# Patient Record
Sex: MALE | Race: WHITE | NOT HISPANIC OR LATINO | Employment: OTHER | URBAN - METROPOLITAN AREA
[De-identification: names, ages, dates, MRNs, and addresses within clinical notes are randomized per-mention and may not be internally consistent; named-entity substitution may affect disease eponyms.]

---

## 2018-01-17 ENCOUNTER — APPOINTMENT (OUTPATIENT)
Dept: LAB | Facility: HOSPITAL | Age: 48
End: 2018-01-17

## 2018-01-17 ENCOUNTER — TRANSCRIBE ORDERS (OUTPATIENT)
Dept: LAB | Facility: HOSPITAL | Age: 48
End: 2018-01-17

## 2018-01-17 DIAGNOSIS — R03.0 ELEVATED BLOOD PRESSURE READING WITHOUT DIAGNOSIS OF HYPERTENSION: Primary | ICD-10-CM

## 2018-01-17 LAB
ALBUMIN SERPL-MCNC: 4.8 G/DL (ref 3.2–4.8)
ALBUMIN/GLOB SERPL: 1.7 G/DL (ref 1.5–2.5)
ALP SERPL-CCNC: 81 U/L (ref 25–100)
ALT SERPL W P-5'-P-CCNC: 155 U/L (ref 7–40)
ANION GAP SERPL CALCULATED.3IONS-SCNC: 9 MMOL/L (ref 3–11)
AST SERPL-CCNC: 80 U/L (ref 0–33)
BASOPHILS # BLD AUTO: 0.02 10*3/MM3 (ref 0–0.2)
BASOPHILS NFR BLD AUTO: 0.3 % (ref 0–1)
BILIRUB SERPL-MCNC: 1 MG/DL (ref 0.3–1.2)
BUN BLD-MCNC: 17 MG/DL (ref 9–23)
BUN/CREAT SERPL: 17 (ref 7–25)
CALCIUM SPEC-SCNC: 9.9 MG/DL (ref 8.7–10.4)
CHLORIDE SERPL-SCNC: 99 MMOL/L (ref 99–109)
CO2 SERPL-SCNC: 28 MMOL/L (ref 20–31)
CREAT BLD-MCNC: 1 MG/DL (ref 0.6–1.3)
DEPRECATED RDW RBC AUTO: 41.8 FL (ref 37–54)
EOSINOPHIL # BLD AUTO: 0.04 10*3/MM3 (ref 0–0.3)
EOSINOPHIL NFR BLD AUTO: 0.5 % (ref 0–3)
ERYTHROCYTE [DISTWIDTH] IN BLOOD BY AUTOMATED COUNT: 12.2 % (ref 11.3–14.5)
GFR SERPL CREATININE-BSD FRML MDRD: 79 ML/MIN/1.73
GLOBULIN UR ELPH-MCNC: 2.9 GM/DL
GLUCOSE BLD-MCNC: 81 MG/DL (ref 70–100)
HCT VFR BLD AUTO: 46.4 % (ref 38.9–50.9)
HGB BLD-MCNC: 16 G/DL (ref 13.1–17.5)
IMM GRANULOCYTES # BLD: 0.02 10*3/MM3 (ref 0–0.03)
IMM GRANULOCYTES NFR BLD: 0.3 % (ref 0–0.6)
LYMPHOCYTES # BLD AUTO: 2.52 10*3/MM3 (ref 0.6–4.8)
LYMPHOCYTES NFR BLD AUTO: 34.6 % (ref 24–44)
MCH RBC QN AUTO: 32.8 PG (ref 27–31)
MCHC RBC AUTO-ENTMCNC: 34.5 G/DL (ref 32–36)
MCV RBC AUTO: 95.1 FL (ref 80–99)
MONOCYTES # BLD AUTO: 0.49 10*3/MM3 (ref 0–1)
MONOCYTES NFR BLD AUTO: 6.7 % (ref 0–12)
NEUTROPHILS # BLD AUTO: 4.2 10*3/MM3 (ref 1.5–8.3)
NEUTROPHILS NFR BLD AUTO: 57.6 % (ref 41–71)
PLATELET # BLD AUTO: 219 10*3/MM3 (ref 150–450)
PMV BLD AUTO: 10.7 FL (ref 6–12)
POTASSIUM BLD-SCNC: 4.1 MMOL/L (ref 3.5–5.5)
PROT SERPL-MCNC: 7.7 G/DL (ref 5.7–8.2)
RBC # BLD AUTO: 4.88 10*6/MM3 (ref 4.2–5.76)
SODIUM BLD-SCNC: 136 MMOL/L (ref 132–146)
WBC NRBC COR # BLD: 7.29 10*3/MM3 (ref 3.5–10.8)

## 2018-01-17 PROCEDURE — 85025 COMPLETE CBC W/AUTO DIFF WBC: CPT | Performed by: NURSE PRACTITIONER

## 2018-01-17 PROCEDURE — 80053 COMPREHEN METABOLIC PANEL: CPT | Performed by: NURSE PRACTITIONER

## 2018-01-17 PROCEDURE — 36415 COLL VENOUS BLD VENIPUNCTURE: CPT | Performed by: NURSE PRACTITIONER

## 2018-07-25 ENCOUNTER — HOSPITAL ENCOUNTER (OUTPATIENT)
Dept: GENERAL RADIOLOGY | Facility: HOSPITAL | Age: 48
Discharge: HOME | End: 2018-07-25
Admitting: NURSE PRACTITIONER

## 2018-07-25 ENCOUNTER — TRANSCRIBE ORDERS (OUTPATIENT)
Dept: ADMINISTRATIVE | Facility: HOSPITAL | Age: 48
End: 2018-07-25

## 2018-07-25 DIAGNOSIS — R05.9 COUGH: Primary | ICD-10-CM

## 2018-07-25 PROCEDURE — 71046 X-RAY EXAM CHEST 2 VIEWS: CPT

## 2018-10-19 ENCOUNTER — HOSPITAL ENCOUNTER (INPATIENT)
Facility: HOSPITAL | Age: 48
LOS: 2 days | Discharge: HOME | End: 2018-10-22
Attending: EMERGENCY MEDICINE | Admitting: INTERNAL MEDICINE

## 2018-10-19 ENCOUNTER — APPOINTMENT (OUTPATIENT)
Dept: CT IMAGING | Facility: HOSPITAL | Age: 48
End: 2018-10-19

## 2018-10-19 ENCOUNTER — APPOINTMENT (OUTPATIENT)
Dept: GENERAL RADIOLOGY | Facility: HOSPITAL | Age: 48
End: 2018-10-19

## 2018-10-19 DIAGNOSIS — K56.609 SMALL BOWEL OBSTRUCTION (HCC): Primary | ICD-10-CM

## 2018-10-19 LAB
ALBUMIN SERPL-MCNC: 4.66 G/DL (ref 3.2–4.8)
ALBUMIN/GLOB SERPL: 2.1 G/DL (ref 1.5–2.5)
ALP SERPL-CCNC: 65 U/L (ref 25–100)
ALT SERPL W P-5'-P-CCNC: 55 U/L (ref 7–40)
ANION GAP SERPL CALCULATED.3IONS-SCNC: 7 MMOL/L (ref 3–11)
AST SERPL-CCNC: 35 U/L (ref 0–33)
BASOPHILS # BLD AUTO: 0.02 10*3/MM3 (ref 0–0.2)
BASOPHILS NFR BLD AUTO: 0.2 % (ref 0–1)
BILIRUB SERPL-MCNC: 0.8 MG/DL (ref 0.3–1.2)
BILIRUB UR QL STRIP: NEGATIVE
BUN BLD-MCNC: 14 MG/DL (ref 9–23)
BUN/CREAT SERPL: 14.7 (ref 7–25)
CALCIUM SPEC-SCNC: 9.6 MG/DL (ref 8.7–10.4)
CHLORIDE SERPL-SCNC: 105 MMOL/L (ref 99–109)
CLARITY UR: CLEAR
CO2 SERPL-SCNC: 29 MMOL/L (ref 20–31)
COLOR UR: YELLOW
CREAT BLD-MCNC: 0.95 MG/DL (ref 0.6–1.3)
D-LACTATE SERPL-SCNC: 2.1 MMOL/L (ref 0.5–2)
DEPRECATED RDW RBC AUTO: 41.9 FL (ref 37–54)
EOSINOPHIL # BLD AUTO: 0.1 10*3/MM3 (ref 0–0.3)
EOSINOPHIL NFR BLD AUTO: 0.9 % (ref 0–3)
ERYTHROCYTE [DISTWIDTH] IN BLOOD BY AUTOMATED COUNT: 12 % (ref 11.3–14.5)
GFR SERPL CREATININE-BSD FRML MDRD: 84 ML/MIN/1.73
GLOBULIN UR ELPH-MCNC: 2.2 GM/DL
GLUCOSE BLD-MCNC: 112 MG/DL (ref 70–100)
GLUCOSE UR STRIP-MCNC: NEGATIVE MG/DL
HCT VFR BLD AUTO: 42.5 % (ref 38.9–50.9)
HGB BLD-MCNC: 14.9 G/DL (ref 13.1–17.5)
HGB UR QL STRIP.AUTO: NEGATIVE
HOLD SPECIMEN: NORMAL
HOLD SPECIMEN: NORMAL
IMM GRANULOCYTES # BLD: 0.02 10*3/MM3 (ref 0–0.03)
IMM GRANULOCYTES NFR BLD: 0.2 % (ref 0–0.6)
KETONES UR QL STRIP: NEGATIVE
LEUKOCYTE ESTERASE UR QL STRIP.AUTO: NEGATIVE
LIPASE SERPL-CCNC: 49 U/L (ref 6–51)
LYMPHOCYTES # BLD AUTO: 2.38 10*3/MM3 (ref 0.6–4.8)
LYMPHOCYTES NFR BLD AUTO: 22.6 % (ref 24–44)
MCH RBC QN AUTO: 33.6 PG (ref 27–31)
MCHC RBC AUTO-ENTMCNC: 35.1 G/DL (ref 32–36)
MCV RBC AUTO: 95.7 FL (ref 80–99)
MONOCYTES # BLD AUTO: 0.8 10*3/MM3 (ref 0–1)
MONOCYTES NFR BLD AUTO: 7.6 % (ref 0–12)
NEUTROPHILS # BLD AUTO: 7.25 10*3/MM3 (ref 1.5–8.3)
NEUTROPHILS NFR BLD AUTO: 68.7 % (ref 41–71)
NITRITE UR QL STRIP: NEGATIVE
PH UR STRIP.AUTO: 5.5 [PH] (ref 5–8)
PLATELET # BLD AUTO: 196 10*3/MM3 (ref 150–450)
PMV BLD AUTO: 10.2 FL (ref 6–12)
POTASSIUM BLD-SCNC: 4.1 MMOL/L (ref 3.5–5.5)
PROT SERPL-MCNC: 6.9 G/DL (ref 5.7–8.2)
PROT UR QL STRIP: NEGATIVE
RBC # BLD AUTO: 4.44 10*6/MM3 (ref 4.2–5.76)
SODIUM BLD-SCNC: 141 MMOL/L (ref 132–146)
SP GR UR STRIP: 1.02 (ref 1–1.03)
TROPONIN I SERPL-MCNC: 0 NG/ML (ref 0–0.07)
UROBILINOGEN UR QL STRIP: NORMAL
WBC NRBC COR # BLD: 10.55 10*3/MM3 (ref 3.5–10.8)
WHOLE BLOOD HOLD SPECIMEN: NORMAL
WHOLE BLOOD HOLD SPECIMEN: NORMAL

## 2018-10-19 PROCEDURE — 71045 X-RAY EXAM CHEST 1 VIEW: CPT

## 2018-10-19 PROCEDURE — 84484 ASSAY OF TROPONIN QUANT: CPT

## 2018-10-19 PROCEDURE — 85025 COMPLETE CBC W/AUTO DIFF WBC: CPT | Performed by: EMERGENCY MEDICINE

## 2018-10-19 PROCEDURE — 83605 ASSAY OF LACTIC ACID: CPT | Performed by: EMERGENCY MEDICINE

## 2018-10-19 PROCEDURE — 80053 COMPREHEN METABOLIC PANEL: CPT | Performed by: EMERGENCY MEDICINE

## 2018-10-19 PROCEDURE — 99285 EMERGENCY DEPT VISIT HI MDM: CPT

## 2018-10-19 PROCEDURE — 81003 URINALYSIS AUTO W/O SCOPE: CPT | Performed by: EMERGENCY MEDICINE

## 2018-10-19 PROCEDURE — 25010000002 MORPHINE PER 10 MG: Performed by: EMERGENCY MEDICINE

## 2018-10-19 PROCEDURE — 25010000002 ONDANSETRON PER 1 MG: Performed by: EMERGENCY MEDICINE

## 2018-10-19 PROCEDURE — 93005 ELECTROCARDIOGRAM TRACING: CPT

## 2018-10-19 PROCEDURE — 74177 CT ABD & PELVIS W/CONTRAST: CPT

## 2018-10-19 PROCEDURE — 93005 ELECTROCARDIOGRAM TRACING: CPT | Performed by: EMERGENCY MEDICINE

## 2018-10-19 PROCEDURE — 83690 ASSAY OF LIPASE: CPT | Performed by: EMERGENCY MEDICINE

## 2018-10-19 RX ORDER — MORPHINE SULFATE 4 MG/ML
4 INJECTION, SOLUTION INTRAMUSCULAR; INTRAVENOUS ONCE
Status: COMPLETED | OUTPATIENT
Start: 2018-10-19 | End: 2018-10-19

## 2018-10-19 RX ORDER — SODIUM CHLORIDE 0.9 % (FLUSH) 0.9 %
10 SYRINGE (ML) INJECTION AS NEEDED
Status: DISCONTINUED | OUTPATIENT
Start: 2018-10-19 | End: 2018-10-22 | Stop reason: HOSPADM

## 2018-10-19 RX ORDER — OMEPRAZOLE 20 MG/1
20 CAPSULE, DELAYED RELEASE ORAL DAILY
COMMUNITY
End: 2019-06-11

## 2018-10-19 RX ORDER — ONDANSETRON 2 MG/ML
4 INJECTION INTRAMUSCULAR; INTRAVENOUS ONCE
Status: COMPLETED | OUTPATIENT
Start: 2018-10-19 | End: 2018-10-19

## 2018-10-19 RX ORDER — ATORVASTATIN CALCIUM 10 MG/1
TABLET, FILM COATED ORAL DAILY
COMMUNITY
Start: 2018-07-25 | End: 2019-02-22 | Stop reason: SDUPTHER

## 2018-10-19 RX ORDER — LOSARTAN POTASSIUM 25 MG/1
TABLET ORAL DAILY
COMMUNITY
Start: 2018-09-19 | End: 2019-03-12

## 2018-10-19 RX ADMIN — MORPHINE SULFATE 4 MG: 4 INJECTION, SOLUTION INTRAMUSCULAR; INTRAVENOUS at 22:03

## 2018-10-19 RX ADMIN — SODIUM CHLORIDE 1000 ML: 9 INJECTION, SOLUTION INTRAVENOUS at 22:03

## 2018-10-19 RX ADMIN — ONDANSETRON HYDROCHLORIDE 4 MG: 2 INJECTION, SOLUTION INTRAMUSCULAR; INTRAVENOUS at 22:03

## 2018-10-19 RX ADMIN — MORPHINE SULFATE 4 MG: 4 INJECTION, SOLUTION INTRAMUSCULAR; INTRAVENOUS at 23:47

## 2018-10-20 PROBLEM — E78.5 HLD (HYPERLIPIDEMIA): Status: ACTIVE | Noted: 2018-10-20

## 2018-10-20 PROBLEM — I10 HTN (HYPERTENSION): Status: ACTIVE | Noted: 2018-10-20

## 2018-10-20 PROBLEM — K21.9 GERD WITHOUT ESOPHAGITIS: Status: ACTIVE | Noted: 2018-10-20

## 2018-10-20 PROBLEM — K56.609 SMALL BOWEL OBSTRUCTION: Status: ACTIVE | Noted: 2018-10-20

## 2018-10-20 LAB
ANION GAP SERPL CALCULATED.3IONS-SCNC: 11 MMOL/L (ref 3–11)
BASOPHILS # BLD AUTO: 0.02 10*3/MM3 (ref 0–0.2)
BASOPHILS NFR BLD AUTO: 0.2 % (ref 0–1)
BUN BLD-MCNC: 15 MG/DL (ref 9–23)
BUN/CREAT SERPL: 17.6 (ref 7–25)
CALCIUM SPEC-SCNC: 9.3 MG/DL (ref 8.7–10.4)
CHLORIDE SERPL-SCNC: 101 MMOL/L (ref 99–109)
CO2 SERPL-SCNC: 26 MMOL/L (ref 20–31)
CREAT BLD-MCNC: 0.85 MG/DL (ref 0.6–1.3)
D-LACTATE SERPL-SCNC: 1.8 MMOL/L (ref 0.5–2)
D-LACTATE SERPL-SCNC: 2.5 MMOL/L (ref 0.5–2)
D-LACTATE SERPL-SCNC: 2.6 MMOL/L (ref 0.5–2)
DEPRECATED RDW RBC AUTO: 42.4 FL (ref 37–54)
EOSINOPHIL # BLD AUTO: 0 10*3/MM3 (ref 0–0.3)
EOSINOPHIL NFR BLD AUTO: 0 % (ref 0–3)
ERYTHROCYTE [DISTWIDTH] IN BLOOD BY AUTOMATED COUNT: 12 % (ref 11.3–14.5)
GFR SERPL CREATININE-BSD FRML MDRD: 95 ML/MIN/1.73
GLUCOSE BLD-MCNC: 117 MG/DL (ref 70–100)
HCT VFR BLD AUTO: 45.1 % (ref 38.9–50.9)
HGB BLD-MCNC: 15.4 G/DL (ref 13.1–17.5)
HOLD SPECIMEN: NORMAL
IMM GRANULOCYTES # BLD: 0.03 10*3/MM3 (ref 0–0.03)
IMM GRANULOCYTES NFR BLD: 0.2 % (ref 0–0.6)
LYMPHOCYTES # BLD AUTO: 1.41 10*3/MM3 (ref 0.6–4.8)
LYMPHOCYTES NFR BLD AUTO: 11 % (ref 24–44)
MCH RBC QN AUTO: 33.1 PG (ref 27–31)
MCHC RBC AUTO-ENTMCNC: 34.1 G/DL (ref 32–36)
MCV RBC AUTO: 97 FL (ref 80–99)
MONOCYTES # BLD AUTO: 1.04 10*3/MM3 (ref 0–1)
MONOCYTES NFR BLD AUTO: 8.1 % (ref 0–12)
NEUTROPHILS # BLD AUTO: 10.37 10*3/MM3 (ref 1.5–8.3)
NEUTROPHILS NFR BLD AUTO: 80.7 % (ref 41–71)
PLATELET # BLD AUTO: 192 10*3/MM3 (ref 150–450)
PMV BLD AUTO: 10.5 FL (ref 6–12)
POTASSIUM BLD-SCNC: 4.5 MMOL/L (ref 3.5–5.5)
RBC # BLD AUTO: 4.65 10*6/MM3 (ref 4.2–5.76)
SODIUM BLD-SCNC: 138 MMOL/L (ref 132–146)
WBC NRBC COR # BLD: 12.84 10*3/MM3 (ref 3.5–10.8)

## 2018-10-20 PROCEDURE — 25010000002 HEPARIN (PORCINE) PER 1000 UNITS: Performed by: HOSPITALIST

## 2018-10-20 PROCEDURE — 99223 1ST HOSP IP/OBS HIGH 75: CPT | Performed by: INTERNAL MEDICINE

## 2018-10-20 PROCEDURE — 25010000002 HYDROMORPHONE 1 MG/ML SOLUTION: Performed by: INTERNAL MEDICINE

## 2018-10-20 PROCEDURE — 83605 ASSAY OF LACTIC ACID: CPT | Performed by: HOSPITALIST

## 2018-10-20 PROCEDURE — 25010000002 HYDROMORPHONE PER 4 MG: Performed by: INTERNAL MEDICINE

## 2018-10-20 PROCEDURE — 85025 COMPLETE CBC W/AUTO DIFF WBC: CPT | Performed by: INTERNAL MEDICINE

## 2018-10-20 PROCEDURE — 0D9670Z DRAINAGE OF STOMACH WITH DRAINAGE DEVICE, VIA NATURAL OR ARTIFICIAL OPENING: ICD-10-PCS | Performed by: INTERNAL MEDICINE

## 2018-10-20 PROCEDURE — 83605 ASSAY OF LACTIC ACID: CPT | Performed by: NURSE PRACTITIONER

## 2018-10-20 PROCEDURE — 83605 ASSAY OF LACTIC ACID: CPT | Performed by: EMERGENCY MEDICINE

## 2018-10-20 PROCEDURE — 25010000002 ONDANSETRON PER 1 MG: Performed by: INTERNAL MEDICINE

## 2018-10-20 PROCEDURE — 25010000002 HYDROMORPHONE 1 MG/ML SOLUTION: Performed by: EMERGENCY MEDICINE

## 2018-10-20 PROCEDURE — 80048 BASIC METABOLIC PNL TOTAL CA: CPT | Performed by: INTERNAL MEDICINE

## 2018-10-20 PROCEDURE — 25010000002 IOPAMIDOL 61 % SOLUTION: Performed by: EMERGENCY MEDICINE

## 2018-10-20 RX ORDER — SODIUM CHLORIDE 0.9 % (FLUSH) 0.9 %
3 SYRINGE (ML) INJECTION EVERY 12 HOURS SCHEDULED
Status: DISCONTINUED | OUTPATIENT
Start: 2018-10-20 | End: 2018-10-22 | Stop reason: HOSPADM

## 2018-10-20 RX ORDER — NALOXONE HCL 0.4 MG/ML
0.4 VIAL (ML) INJECTION
Status: DISCONTINUED | OUTPATIENT
Start: 2018-10-20 | End: 2018-10-22 | Stop reason: HOSPADM

## 2018-10-20 RX ORDER — ONDANSETRON 2 MG/ML
4 INJECTION INTRAMUSCULAR; INTRAVENOUS EVERY 6 HOURS PRN
Status: DISCONTINUED | OUTPATIENT
Start: 2018-10-20 | End: 2018-10-22 | Stop reason: HOSPADM

## 2018-10-20 RX ORDER — SODIUM CHLORIDE 9 MG/ML
75 INJECTION, SOLUTION INTRAVENOUS CONTINUOUS
Status: DISCONTINUED | OUTPATIENT
Start: 2018-10-20 | End: 2018-10-22 | Stop reason: HOSPADM

## 2018-10-20 RX ORDER — NALOXONE HCL 0.4 MG/ML
0.4 VIAL (ML) INJECTION
Status: DISCONTINUED | OUTPATIENT
Start: 2018-10-20 | End: 2018-10-20

## 2018-10-20 RX ORDER — HEPARIN SODIUM 5000 [USP'U]/ML
5000 INJECTION, SOLUTION INTRAVENOUS; SUBCUTANEOUS EVERY 8 HOURS SCHEDULED
Status: DISCONTINUED | OUTPATIENT
Start: 2018-10-20 | End: 2018-10-22 | Stop reason: HOSPADM

## 2018-10-20 RX ORDER — HYDROMORPHONE HYDROCHLORIDE 1 MG/ML
0.5 INJECTION, SOLUTION INTRAMUSCULAR; INTRAVENOUS; SUBCUTANEOUS
Status: DISCONTINUED | OUTPATIENT
Start: 2018-10-20 | End: 2018-10-20

## 2018-10-20 RX ORDER — SODIUM CHLORIDE 0.9 % (FLUSH) 0.9 %
3-10 SYRINGE (ML) INJECTION AS NEEDED
Status: DISCONTINUED | OUTPATIENT
Start: 2018-10-20 | End: 2018-10-22 | Stop reason: HOSPADM

## 2018-10-20 RX ADMIN — HEPARIN SODIUM 5000 UNITS: 5000 INJECTION, SOLUTION INTRAVENOUS; SUBCUTANEOUS at 22:27

## 2018-10-20 RX ADMIN — ONDANSETRON 4 MG: 2 INJECTION, SOLUTION INTRAMUSCULAR; INTRAVENOUS at 06:20

## 2018-10-20 RX ADMIN — TOPICAL ANESTHETIC 1 SPRAY: 200 SPRAY DENTAL; PERIODONTAL at 23:06

## 2018-10-20 RX ADMIN — TOPICAL ANESTHETIC 1 SPRAY: 200 SPRAY DENTAL; PERIODONTAL at 03:40

## 2018-10-20 RX ADMIN — HYDROMORPHONE HYDROCHLORIDE 1 MG: 1 INJECTION, SOLUTION INTRAMUSCULAR; INTRAVENOUS; SUBCUTANEOUS at 08:26

## 2018-10-20 RX ADMIN — HYDROMORPHONE HYDROCHLORIDE 1 MG: 1 INJECTION, SOLUTION INTRAMUSCULAR; INTRAVENOUS; SUBCUTANEOUS at 19:46

## 2018-10-20 RX ADMIN — HYDROMORPHONE HYDROCHLORIDE 0.5 MG: 1 INJECTION, SOLUTION INTRAMUSCULAR; INTRAVENOUS; SUBCUTANEOUS at 04:41

## 2018-10-20 RX ADMIN — SODIUM CHLORIDE 500 ML: 9 INJECTION, SOLUTION INTRAVENOUS at 11:15

## 2018-10-20 RX ADMIN — HEPARIN SODIUM 5000 UNITS: 5000 INJECTION, SOLUTION INTRAVENOUS; SUBCUTANEOUS at 13:31

## 2018-10-20 RX ADMIN — SODIUM CHLORIDE 150 ML/HR: 9 INJECTION, SOLUTION INTRAVENOUS at 04:42

## 2018-10-20 RX ADMIN — HYDROMORPHONE HYDROCHLORIDE 1 MG: 1 INJECTION, SOLUTION INTRAMUSCULAR; INTRAVENOUS; SUBCUTANEOUS at 01:52

## 2018-10-20 RX ADMIN — HYDROMORPHONE HYDROCHLORIDE 0.5 MG: 1 INJECTION, SOLUTION INTRAMUSCULAR; INTRAVENOUS; SUBCUTANEOUS at 13:31

## 2018-10-20 RX ADMIN — SODIUM CHLORIDE 200 ML/HR: 9 INJECTION, SOLUTION INTRAVENOUS at 11:14

## 2018-10-20 RX ADMIN — TOPICAL ANESTHETIC 1 SPRAY: 200 SPRAY DENTAL; PERIODONTAL at 10:02

## 2018-10-20 RX ADMIN — SODIUM CHLORIDE 200 ML/HR: 9 INJECTION, SOLUTION INTRAVENOUS at 14:20

## 2018-10-20 RX ADMIN — TOPICAL ANESTHETIC 1 SPRAY: 200 SPRAY DENTAL; PERIODONTAL at 15:41

## 2018-10-20 RX ADMIN — IOPAMIDOL 85 ML: 612 INJECTION, SOLUTION INTRAVENOUS at 00:11

## 2018-10-20 RX ADMIN — SODIUM CHLORIDE 200 ML/HR: 9 INJECTION, SOLUTION INTRAVENOUS at 19:45

## 2018-10-20 NOTE — ED PROVIDER NOTES
Subjective   Eitan Royal a 48 year old male who presents to the emergency department with complaints of severe epigastric pain which onset at approximatley 3:30 today. He initially thought the pain was associated with the need to have a bowel movement but he reportedly had a bowel movement soon after onset, which was diarrhea, and did not relieve his pain. The patient states that his epigastric pain has gotten progressively worse, and currently rates his pain a 10/10. He has a PMHx of bowel obstruction, and a PSHx of bowel resection. He denies any nausea, vomiting, chest pain, soa, and any other acute symptoms at this time.        History provided by:  Patient  Abdominal Pain   Pain location:  Epigastric  Pain radiates to:  Does not radiate  Pain severity:  Severe  Onset quality:  Sudden  Duration:  6 hours  Timing:  Constant  Progression:  Worsening  Chronicity:  New  Relieved by:  Nothing  Worsened by:  Nothing  Ineffective treatments:  Bowel activity  Associated symptoms: diarrhea    Associated symptoms: no chest pain, no nausea, no shortness of breath and no vomiting        Review of Systems   Respiratory: Negative for shortness of breath.    Cardiovascular: Negative for chest pain.   Gastrointestinal: Positive for abdominal pain and diarrhea. Negative for nausea and vomiting.   All other systems reviewed and are negative.      Past Medical History:   Diagnosis Date   • GERD (gastroesophageal reflux disease)    • Hyperlipidemia    • Hypertension        No Known Allergies    Past Surgical History:   Procedure Laterality Date   • APPENDECTOMY     • CHOLECYSTECTOMY     • COLON SURGERY     • EXPLORATORY LAPAROTOMY     • KNEE CARTILAGE SURGERY      ACL and MCL       History reviewed. No pertinent family history.    Social History     Social History   • Marital status:      Social History Main Topics   • Smoking status: Former Smoker   • Smokeless tobacco: Current User   • Alcohol use 3.6 oz/week     6 Cans of  beer per week   • Drug use: No   • Sexual activity: Defer     Other Topics Concern   • Not on file         Objective   Physical Exam   Constitutional: He is oriented to person, place, and time. He appears well-developed and well-nourished. He appears distressed.   Patient is uncomfortable, pacing the floor.   HENT:   Head: Normocephalic and atraumatic.   Nose: Nose normal.   Mouth/Throat: Oropharynx is clear and moist. No oropharyngeal exudate.   Eyes: Pupils are equal, round, and reactive to light. Conjunctivae and EOM are normal.   Neck: Normal range of motion.   Cardiovascular: Normal rate, regular rhythm and normal heart sounds.    Pulmonary/Chest: Effort normal and breath sounds normal. No respiratory distress.   Abdominal: He exhibits distension. Bowel sounds are decreased. There is tenderness (diffuse).   Musculoskeletal: Normal range of motion. He exhibits no edema or tenderness.   Neurological: He is alert and oriented to person, place, and time. No cranial nerve deficit.   Skin: Skin is warm and dry.   Nursing note and vitals reviewed.      Procedures         ED Course  ED Course as of Oct 21 0205   Sat Oct 20, 2018   0046 Lactate, Venous: (!!) 2.1 [KG]   0100 Patient was advised of results at this time.  Gen. surgery will be contacted NG tube will be ordered.  Patient will be admitted to the hospital.  [KG]   0110 Dr. Shukla notified patient's presentation, results.  Patient will be admitted to the hospitalist.  She will see the patient in the a.m.  [KG]   0120 Hospitalist contacted at this time.   agrees to admit the patient.  [KG]      ED Course User Index  [KG] Mesha Lara, APRN     Recent Results (from the past 24 hour(s))   Lactic Acid, Reflex    Collection Time: 10/20/18  3:37 AM   Result Value Ref Range    Lactate 2.5 (C) 0.5 - 2.0 mmol/L   CBC Auto Differential    Collection Time: 10/20/18  5:57 AM   Result Value Ref Range    WBC 12.84 (H) 3.50 - 10.80 10*3/mm3    RBC 4.65 4.20 -  5.76 10*6/mm3    Hemoglobin 15.4 13.1 - 17.5 g/dL    Hematocrit 45.1 38.9 - 50.9 %    MCV 97.0 80.0 - 99.0 fL    MCH 33.1 (H) 27.0 - 31.0 pg    MCHC 34.1 32.0 - 36.0 g/dL    RDW 12.0 11.3 - 14.5 %    RDW-SD 42.4 37.0 - 54.0 fl    MPV 10.5 6.0 - 12.0 fL    Platelets 192 150 - 450 10*3/mm3    Neutrophil % 80.7 (H) 41.0 - 71.0 %    Lymphocyte % 11.0 (L) 24.0 - 44.0 %    Monocyte % 8.1 0.0 - 12.0 %    Eosinophil % 0.0 0.0 - 3.0 %    Basophil % 0.2 0.0 - 1.0 %    Immature Grans % 0.2 0.0 - 0.6 %    Neutrophils, Absolute 10.37 (H) 1.50 - 8.30 10*3/mm3    Lymphocytes, Absolute 1.41 0.60 - 4.80 10*3/mm3    Monocytes, Absolute 1.04 (H) 0.00 - 1.00 10*3/mm3    Eosinophils, Absolute 0.00 0.00 - 0.30 10*3/mm3    Basophils, Absolute 0.02 0.00 - 0.20 10*3/mm3    Immature Grans, Absolute 0.03 0.00 - 0.03 10*3/mm3   Basic Metabolic Panel    Collection Time: 10/20/18  5:57 AM   Result Value Ref Range    Glucose 117 (H) 70 - 100 mg/dL    BUN 15 9 - 23 mg/dL    Creatinine 0.85 0.60 - 1.30 mg/dL    Sodium 138 132 - 146 mmol/L    Potassium 4.5 3.5 - 5.5 mmol/L    Chloride 101 99 - 109 mmol/L    CO2 26.0 20.0 - 31.0 mmol/L    Calcium 9.3 8.7 - 10.4 mg/dL    eGFR Non African Amer 95 >60 mL/min/1.73    BUN/Creatinine Ratio 17.6 7.0 - 25.0    Anion Gap 11.0 3.0 - 11.0 mmol/L   Lactic Acid, Plasma    Collection Time: 10/20/18  5:57 AM   Result Value Ref Range    Lactate 2.6 (C) 0.5 - 2.0 mmol/L   Lactic Acid, Plasma    Collection Time: 10/20/18  3:23 PM   Result Value Ref Range    Lactate 1.8 0.5 - 2.0 mmol/L     Note: In addition to lab results from this visit, the labs listed above may include labs taken at another facility or during a different encounter within the last 24 hours. Please correlate lab times with ED admission and discharge times for further clarification of the services performed during this visit.    CT Abdomen Pelvis With Contrast   Final Result   1.  Early versus partial mid to small bowel obstruction in the right     midabdomen, transition point related to adhesions.   2.  Trace free fluid adjacent to dilated small bowel loops.  No free air.   3.  Incidental findings above.      THIS DOCUMENT HAS BEEN ELECTRONICALLY SIGNED BY NICK FENG MD      XR Chest 1 View   Final Result     No acute abnormality.  No detrimental change from prior exam.      THIS DOCUMENT HAS BEEN ELECTRONICALLY SIGNED BY NICK FENG MD        Vitals:    10/20/18 1145 10/20/18 1500 10/20/18 2000 10/21/18 0000   BP: 126/74 122/78 119/75 118/77   BP Location:       Patient Position:       Pulse: 70 68 65 55   Resp: 20 18 20 18   Temp: 98 °F (36.7 °C) 98.1 °F (36.7 °C) 98.5 °F (36.9 °C) 97.5 °F (36.4 °C)   TempSrc: Oral Oral Oral Oral   SpO2: 96% 94% 96% 94%   Weight:       Height:         Medications   sodium chloride 0.9 % flush 10 mL (not administered)   sodium chloride 0.9 % flush 10 mL (not administered)   sodium chloride 0.9 % flush 3 mL (3 mL Intravenous Not Given 10/20/18 2102)   sodium chloride 0.9 % flush 3-10 mL (not administered)   sodium chloride 0.9 % infusion (200 mL/hr Intravenous New Bag 10/21/18 0036)   ondansetron (ZOFRAN) injection 4 mg (4 mg Intravenous Given 10/20/18 0620)   benzocaine (HURRICAINE) 20 % liquid solution 1 spray (1 spray Mouth/Throat Given 10/20/18 2306)   HYDROmorphone (DILAUDID) injection 1 mg (1 mg Intravenous Given 10/20/18 1946)     And   naloxone (NARCAN) injection 0.4 mg (not administered)   heparin (porcine) 5000 UNIT/ML injection 5,000 Units (5,000 Units Subcutaneous Given 10/20/18 2227)   sodium chloride 0.9 % bolus 1,000 mL (0 mL Intravenous Stopped 10/19/18 2303)   Morphine sulfate (PF) injection 4 mg (4 mg Intravenous Given 10/19/18 2203)   ondansetron (ZOFRAN) injection 4 mg (4 mg Intravenous Given 10/19/18 2203)   Morphine sulfate (PF) injection 4 mg (4 mg Intravenous Given 10/19/18 2347)   iopamidol (ISOVUE-300) 61 % injection 100 mL (85 mL Intravenous Given 10/20/18 0011)   HYDROmorphone (DILAUDID)  injection 1 mg (1 mg Intravenous Given 10/20/18 0152)   sodium chloride 0.9 % bolus 500 mL (500 mL Intravenous New Bag 10/20/18 1115)     ECG/EMG Results (last 24 hours)     Procedure Component Value Units Date/Time    ECG 12 Lead [[id number]] Collected:  10/19/18 2047     Updated:  10/19/18 2048                      MDM    Final diagnoses:   Small bowel obstruction (CMS/HCC)       Documentation assistance provided by jeremías Canales.  Information recorded by the scribe was done at my direction and has been verified and validated by me.     Daniela Canales  10/19/18 2107       Daniela Canales  10/19/18 2154       Ryan Bull  10/19/18 2206       Mesha Lara, RUDDY  10/21/18 0207

## 2018-10-20 NOTE — PLAN OF CARE
Problem: Patient Care Overview  Goal: Plan of Care Review  Outcome: Ongoing (interventions implemented as appropriate)   10/20/18 8048   OTHER   Outcome Summary pain under better control this afternoon. NG to LWS in use. ivf nad noted.    Coping/Psychosocial   Plan of Care Reviewed With patient;spouse   Plan of Care Review   Progress improving

## 2018-10-20 NOTE — H&P
[facility] Medicine Services  HISTORY AND PHYSICAL    Patient Name: Eitan Royal  : 1970  MRN: [id number]  Primary Care Physician: Shahram Napier MD  Date of admission: 10/19/2018      Subjective   Subjective     Chief Complaint:  Abdominal pain, nausea and vomiting.    HPI:  Eitan Royal is a 48 y.o. male who presented to ER with c/o severe abdominal pain, around the epigastric area, associated with nausea and vomiting. Patient states that the pain started suddenly at about 3:30pm and he thought he would feel better after a bowel movement but that did not happen. Patient reports that he had a loose BM. No report of fever, chills, chest pain, SOB, urinary frequency or dysuria. He has had a prior h/o bowel obstruction in  and subsequently underwent partial colectomy and complicated appendectomy. Other past history includes HTN, GERD/ hiatal hernia and HLD. In the ER, CT scan of the abdomen/pelvis revealed early versus partial mild SBO in the right mid abdomen, transition point related to adhesion. Patient is admitted for further care.    Review of Systems   Constitutional: Negative for chills, diaphoresis, fatigue and fever.   HENT: Negative for congestion, sore throat and trouble swallowing.    Eyes: Negative for photophobia and visual disturbance.   Respiratory: Negative for cough, chest tightness, shortness of breath and wheezing.    Cardiovascular: Negative for chest pain, palpitations and leg swelling.   Gastrointestinal: Positive for abdominal distention, abdominal pain, diarrhea, nausea and vomiting. Negative for constipation.   Endocrine: Negative for polyuria.   Genitourinary: Negative for dysuria, flank pain, frequency, hematuria and urgency.   Musculoskeletal: Negative for arthralgias, back pain, neck pain and neck stiffness.   Skin: Negative for rash.   Neurological: Negative for dizziness, syncope, numbness and headaches.   Psychiatric/Behavioral: Negative for  agitation, confusion and hallucinations.   All other systems reviewed and are negative.         Personal History     History reviewed. No pertinent past medical history.    Past Surgical History:   Procedure Laterality Date   • APPENDECTOMY     • CHOLECYSTECTOMY     • COLON SURGERY     • EXPLORATORY LAPAROTOMY     • KNEE CARTILAGE SURGERY      ACL and MCL       Family History: family history is not on file.     Social History:  does not have a smoking history on file. He uses smokeless tobacco. He reports that he drinks about 3.6 oz of alcohol per week . He reports that he does not use drugs.  Social History     Social History Narrative   • No narrative on file       Medications:  Available home medication information reviewed     No Known Allergies    Objective   Objective     Vital Signs:   Temp:  [97.4 °F (36.3 °C)] 97.4 °F (36.3 °C)  Heart Rate:  [67] 67  Resp:  [20] 20  BP: (138)/(90) 138/90        Physical Exam   Constitutional: No acute distress, awake, alert  Eyes: PERRLA, sclerae anicteric, no conjunctival injection  HENT: NCAT, mucous membranes moist  Neck: Supple, no thyromegaly, no lymphadenopathy, trachea midline  Respiratory: Clear to auscultation bilaterally, nonlabored respirations   Cardiovascular: RRR, no murmurs, rubs, or gallops, palpable pedal pulses bilaterally  Gastrointestinal: Positive bowel sounds, soft, epigastric tenderness, mildly distended  Musculoskeletal: No bilateral ankle edema, no clubbing or cyanosis to extremities  Psychiatric: Appropriate affect, cooperative  Neurologic: Oriented x 3, strength symmetric in all extremities, Cranial Nerves grossly intact, speech clear  Skin: No rashes    Results Reviewed:  I have personally reviewed current lab, radiology, and data and agree.      Results from last 7 days  Lab Units 10/19/18  2053   WBC 10*3/mm3 10.55   HEMOGLOBIN g/dL 14.9   HEMATOCRIT % 42.5   PLATELETS 10*3/mm3 196       Results from last 7 days  Lab Units 10/19/18  2053    SODIUM mmol/L 141   POTASSIUM mmol/L 4.1   CHLORIDE mmol/L 105   CO2 mmol/L 29.0   BUN mg/dL 14   CREATININE mg/dL 0.95   GLUCOSE mg/dL 112*   CALCIUM mg/dL 9.6   ALT (SGPT) U/L 55*   AST (SGOT) U/L 35*     Estimated Creatinine Clearance: 116.7 mL/min (by C-G formula based on SCr of 0.95 mg/dL).  Brief Urine Lab Results  (Last result in the past 365 days)      Color   Clarity   Blood   Leuk Est   Nitrite   Protein   CREAT   Urine HCG        10/19/18 2345 Yellow Clear Negative Negative Negative Negative             No results found for: BNP  Imaging Results (last 24 hours)     Procedure Component Value Units Date/Time    CT Abdomen Pelvis With Contrast [[id number]] Collected:  10/19/18 2110     Updated:  10/20/18 0050    Narrative:       EXAM:    CT Abdomen and Pelvis With Intravenous Contrast    CLINICAL HISTORY:    48 years old, male; Pain; Abdominal pain; Other: Mid abd pain and n/v; Prior   surgery; Surgery type: Terri; Additional info: Abd pain, h/o sbo    TECHNIQUE:    Axial computed tomography images of the abdomen and pelvis with intravenous   contrast.  All CT scans at this facility use at least one of these dose   optimization techniques: automated exposure control; mA and/or kV adjustment   per patient size (includes targeted exams where dose is matched to clinical   indication); or iterative reconstruction.    Coronal and sagittal reformatted images were created and reviewed.    CONTRAST:    100 mL of isovue 300 administered intravenously.      COMPARISON:    No relevant prior studies available.    FINDINGS:    Lung bases: Calcified granuloma.  No mass.  No consolidation.     ABDOMEN:    Liver:  Subcentimeter hepatic hypodensity, probable cyst.    Gallbladder and bile ducts:  Cholecystectomy.  No ductal dilation.    Pancreas:  Unremarkable.  No mass.  No ductal dilation.    Spleen:  Unremarkable.  No splenomegaly.    Adrenals:  Unremarkable.  No mass.    Kidneys and ureters:  Right renal scarring.  No  obstructive uropathy.    Stomach and bowel:  Dilated mid small bowel loops with transition point in   the anterior mid abdomen with air angulated loops of bowel, likely   enteroperitoneal lesions.  No pneumatosis intestinalis.  Small bowel distal to   transition point is decompressed.  Enterocolonic anastomosis, right   hemicolectomy.     PELVIS:    Appendix:  See above.    Bladder:  Unremarkable.  No mass.    Reproductive:  Unremarkable as visualized.     ABDOMEN and PELVIS:    Intraperitoneal space: Trace free fluid adjacent to dilated small bowel   loops..  No free air.  No significant fluid collection.    Bones/joints:  No acute fracture.  No dislocation.    Soft tissues:  Unremarkable.    Vasculature:  Unremarkable.  No abdominal aortic aneurysm.    Lymph nodes:  Unremarkable.  No enlarged lymph nodes.      Impression:       1.  Early versus partial mid to small bowel obstruction in the right   midabdomen, transition point related to adhesions.  2.  Trace free fluid adjacent to dilated small bowel loops.  No free air.  3.  Incidental findings above.    THIS DOCUMENT HAS BEEN ELECTRONICALLY SIGNED BY NICK FENG MD    XR Chest 1 View [[id number]] Collected:  10/19/18 2046     Updated:  10/19/18 2135    Narrative:       EXAM:    XR Chest, 1 View    CLINICAL HISTORY:    48 years old, male; Signs and symptoms; Other: See notes; Additional info:   Upper abdominal pain triage protocol    TECHNIQUE:    Frontal view of the chest.    COMPARISON:    CR XR CHEST PA AND LATERAL 7/25/2018 4:04 PM    FINDINGS:    Lungs:  Unremarkable.  No consolidation.    Pleural space:  Unremarkable.  No pneumothorax.    Heart:  Unremarkable.  No cardiomegaly.    Mediastinum:  Unremarkable.    Bones/joints:  Unremarkable.      Impression:         No acute abnormality.  No detrimental change from prior exam.    THIS DOCUMENT HAS BEEN ELECTRONICALLY SIGNED BY NICK FENG MD             Assessment/Plan   Assessment / Plan     [facility]  "Problems    Diagnosis   • Small bowel obstruction (CMS/HCC)   • HTN (hypertension)   • GERD without esophagitis   • HLD (hyperlipidemia)         Assessment & Plan:  1. Partial or early SBO related to adhesions.  2. Mild transaminitis, chronic  3. H/O partial colectomy  4. HTN  5. Dyslipdemia      Admit to medical floor for further evaluation and treatment.  Will keep npo, insert NGT for gastric decompression.  Will continue IV hydration and pain control as needed.  Patient's wife tell me that his LFTs are chronically elevated and it was thought to be related to use of statins. However, patient drinks 6-8 beers \"occasionally\". Will monitor for ETOH withdrawal.  Lewis. SCDs for DVT prophylaxis.      CODE STATUS:    Code Status and Medical Interventions:   Ordered at: 10/20/18 0236     Level Of Support Discussed With:    Patient     Code Status:    CPR     Medical Interventions (Level of Support Prior to Arrest):    Full       Electronically signed by Isac Baum MD, 10/20/18, 2:37 AM.      "

## 2018-10-20 NOTE — CONSULTS
"General Surgery Consultation Note    Date of Service:  Eitan Royal  [id number]  1970      Referring Provider: Neal Seymour MD    Location of Consult: [id number]    Reason for Consultation: SBO    History of Present Illness:  I am seeing, Eitan Royal, in consultation for Neal Seymour MD regarding SBO.  Pt had sudden onset of pain at 3:30 yesterday afternoon.  He had a loose BM after this and presented to our ED last night with worsening pain.  In ED he began having nausea and vomiting. CT scan was c/w \"early/partial SBO\".  No flatus or BM today. Pain is gone.  Nausea has also resolved.  NG placed last night and about 500 out initially but not working when I saw him this am. Pt had a complicated appy at  followed by a Right colon by Dr. Greene several years ago.      Past Medical History:   Diagnosis Date   • GERD (gastroesophageal reflux disease)    • Hyperlipidemia    • Hypertension        No Known Allergies    No current facility-administered medications on file prior to encounter.      No current outpatient prescriptions on file prior to encounter.         Current Facility-Administered Medications:   •  benzocaine (HURRICAINE) 20 % liquid solution 1 spray, 1 spray, Mouth/Throat, 4x Daily PRN, Isac Baum MD, 1 spray at 10/20/18 1002  •  heparin (porcine) 5000 UNIT/ML injection 5,000 Units, 5,000 Units, Subcutaneous, Q8H, Neal Seymour MD  •  HYDROmorphone (DILAUDID) injection 1 mg, 1 mg, Intravenous, Q3H PRN, 1 mg at 10/20/18 0826 **AND** naloxone (NARCAN) injection 0.4 mg, 0.4 mg, Intravenous, Q5 Min PRN, Isac Baum MD  •  ondansetron (ZOFRAN) injection 4 mg, 4 mg, Intravenous, Q6H PRN, Isac Baum MD, 4 mg at 10/20/18 0620  •  sodium chloride 0.9 % bolus 500 mL, 500 mL, Intravenous, Once, Neal Seymour MD, Last Rate: 500 mL/hr at 10/20/18 1115, 500 mL at 10/20/18 1115  •  sodium chloride 0.9 % flush 10 mL, 10 mL, Intravenous, PRN, Mata Reich MD  •  " "Insert peripheral IV, , , Once **AND** sodium chloride 0.9 % flush 10 mL, 10 mL, Intravenous, PRN, Eddi Patterson MD  •  sodium chloride 0.9 % flush 3 mL, 3 mL, Intravenous, Q12H, Isac Baum MD  •  sodium chloride 0.9 % flush 3-10 mL, 3-10 mL, Intravenous, PRN, Isac Baum MD  •  sodium chloride 0.9 % infusion, 200 mL/hr, Intravenous, Continuous, Neal Seymour MD, Last Rate: 200 mL/hr at 10/20/18 1115, 200 mL/hr at 10/20/18 1115    Past Surgical History:   • APPENDECTOMY   • CHOLECYSTECTOMY   • COLON SURGERY   • EXPLORATORY LAPAROTOMY   • KNEE CARTILAGE SURGERY    ACL and MCL       History reviewed. No pertinent family history.  Social History     Social History   • Marital status:      Spouse name: N/A   • Number of children: N/A   • Years of education: N/A     Occupational History   • Not on file.     Social History Main Topics   • Smoking status: Former Smoker   • Smokeless tobacco: Current User   • Alcohol use 3.6 oz/week     6 Cans of beer per week   • Drug use: No   • Sexual activity: Defer     Other Topics Concern   • Not on file     Social History Narrative   • No narrative on file       Review of Systems:  Review of Systems  Otherwise the 12 point review of systems is negative.    /78   Pulse 70   Temp 97.7 °F (36.5 °C) (Oral)   Resp 20   Ht 185.4 cm (73\")   Wt 102 kg (224 lb 6.4 oz)   SpO2 96%   BMI 29.61 kg/m²   Body mass index is 29.61 kg/m².    General: alert, nad  HEENT: PER, no icterus, normal sclera  Cardiac: regular rhythm,  no audible rubs  Pulmonary: bilateral breath sounds, non labored  Abdominal: soft, no tenderness, diminished bowel sounds  Neurologic: awake, alert, no obvious focal deficits  Extremities: warm, no edema  Skin: no obvious rashes nor worrisome lesions seen     CBC    Results from last 7 days  Lab Units 10/20/18  0557   WBC 10*3/mm3 12.84*   HEMOGLOBIN g/dL 15.4   HEMATOCRIT % 45.1   PLATELETS 10*3/mm3 192       CMP    Results " from last 7 days  Lab Units 10/20/18  0557 10/19/18  2053   SODIUM mmol/L 138 141   POTASSIUM mmol/L 4.5 4.1   CHLORIDE mmol/L 101 105   CO2 mmol/L 26.0 29.0   BUN mg/dL 15 14   CREATININE mg/dL 0.85 0.95   CALCIUM mg/dL 9.3 9.6   BILIRUBIN mg/dL  --  0.8   ALK PHOS U/L  --  65   ALT (SGPT) U/L  --  55*   AST (SGOT) U/L  --  35*   GLUCOSE mg/dL 117* 112*       Radiology  Imaging Results (last 72 hours)     Procedure Component Value Units Date/Time    CT Abdomen Pelvis With Contrast [[id number]] Collected:  10/19/18 2110     Updated:  10/20/18 0050    Narrative:       EXAM:    CT Abdomen and Pelvis With Intravenous Contrast    CLINICAL HISTORY:    48 years old, male; Pain; Abdominal pain; Other: Mid abd pain and n/v; Prior   surgery; Surgery type: Terri; Additional info: Abd pain, h/o sbo    TECHNIQUE:    Axial computed tomography images of the abdomen and pelvis with intravenous   contrast.  All CT scans at this facility use at least one of these dose   optimization techniques: automated exposure control; mA and/or kV adjustment   per patient size (includes targeted exams where dose is matched to clinical   indication); or iterative reconstruction.    Coronal and sagittal reformatted images were created and reviewed.    CONTRAST:    100 mL of isovue 300 administered intravenously.      COMPARISON:    No relevant prior studies available.    FINDINGS:    Lung bases: Calcified granuloma.  No mass.  No consolidation.     ABDOMEN:    Liver:  Subcentimeter hepatic hypodensity, probable cyst.    Gallbladder and bile ducts:  Cholecystectomy.  No ductal dilation.    Pancreas:  Unremarkable.  No mass.  No ductal dilation.    Spleen:  Unremarkable.  No splenomegaly.    Adrenals:  Unremarkable.  No mass.    Kidneys and ureters:  Right renal scarring.  No obstructive uropathy.    Stomach and bowel:  Dilated mid small bowel loops with transition point in   the anterior mid abdomen with air angulated loops of bowel, likely    enteroperitoneal lesions.  No pneumatosis intestinalis.  Small bowel distal to   transition point is decompressed.  Enterocolonic anastomosis, right   hemicolectomy.     PELVIS:    Appendix:  See above.    Bladder:  Unremarkable.  No mass.    Reproductive:  Unremarkable as visualized.     ABDOMEN and PELVIS:    Intraperitoneal space: Trace free fluid adjacent to dilated small bowel   loops..  No free air.  No significant fluid collection.    Bones/joints:  No acute fracture.  No dislocation.    Soft tissues:  Unremarkable.    Vasculature:  Unremarkable.  No abdominal aortic aneurysm.    Lymph nodes:  Unremarkable.  No enlarged lymph nodes.      Impression:       1.  Early versus partial mid to small bowel obstruction in the right   midabdomen, transition point related to adhesions.  2.  Trace free fluid adjacent to dilated small bowel loops.  No free air.  3.  Incidental findings above.    THIS DOCUMENT HAS BEEN ELECTRONICALLY SIGNED BY NICK FENG MD    XR Chest 1 View [[id number]] Collected:  10/19/18 2046     Updated:  10/19/18 2135    Narrative:       EXAM:    XR Chest, 1 View    CLINICAL HISTORY:    48 years old, male; Signs and symptoms; Other: See notes; Additional info:   Upper abdominal pain triage protocol    TECHNIQUE:    Frontal view of the chest.    COMPARISON:    CR XR CHEST PA AND LATERAL 7/25/2018 4:04 PM    FINDINGS:    Lungs:  Unremarkable.  No consolidation.    Pleural space:  Unremarkable.  No pneumothorax.    Heart:  Unremarkable.  No cardiomegaly.    Mediastinum:  Unremarkable.    Bones/joints:  Unremarkable.      Impression:         No acute abnormality.  No detrimental change from prior exam.    THIS DOCUMENT HAS BEEN ELECTRONICALLY SIGNED BY NICK FENG MD          Patient Active Problem List   Diagnosis Code   • Small bowel obstruction (CMS/HCC) K56.609   • HTN (hypertension) I10   • GERD without esophagitis K21.9   • HLD (hyperlipidemia) E78.5         Assessment  1. SBO with no acute  abdominal findings on exam and no concerning findings on imaging. CT scan reviewed with Dr. Bahena. Likely secondary to adhesions from prior surgery.  2. Leukocytosis  3. Mild lactic acidosis  4. Hypovolemia and oliguria    Plan:  1. Repositioned NG and got LWS working. NG output appears to be clearing.  Continue NG for now. Reasonable to attempt conservative treatment of this but I explained to pt and family that the majority of these resolve without surgery but if his pain worsens or if his LA/WBC worsen he may need surgical intervention.   2,3,4. I discussed with Dr. Seymour and he will increase fluids and give a bolus. We will recheck labs later this afternoon.      Carol Shukla MD  10/20/18  11:37 AM

## 2018-10-20 NOTE — PLAN OF CARE
Problem: Patient Care Overview  Goal: Plan of Care Review  Outcome: Ongoing (interventions implemented as appropriate)   10/20/18 0758   OTHER   Outcome Summary new admit. critical lactate noted.      Goal: Individualization and Mutuality  Outcome: Ongoing (interventions implemented as appropriate)    Goal: Discharge Needs Assessment  Outcome: Ongoing (interventions implemented as appropriate)    Goal: Interprofessional Rounds/Family Conf  Outcome: Ongoing (interventions implemented as appropriate)      Problem: Pain, Acute (Adult)  Goal: Identify Related Risk Factors and Signs and Symptoms  Outcome: Outcome(s) achieved Date Met: 10/20/18    Goal: Acceptable Pain Control/Comfort Level  Outcome: Ongoing (interventions implemented as appropriate)      Problem: Bowel Obstruction (Adult)  Goal: Signs and Symptoms of Listed Potential Problems Will be Absent, Minimized or Managed (Bowel Obstruction)  Outcome: Ongoing (interventions implemented as appropriate)

## 2018-10-20 NOTE — PROGRESS NOTES
[facility] Medicine Services  PROGRESS NOTE    Patient Name: Eitan Royal  : 1970  MRN: [id number]    Date of Admission: 10/19/2018  Length of Stay: 0  Primary Care Physician: Shahram Napier MD    Subjective   Subjective     CC:  Abd pain    HPI:  Pain and cramping better. Bloating better. Sore throat from NG. No f/c. No flatus yet. No dyspnea.    Review of Systems    Otherwise ROS is negative except as mentioned in the HPI.    Objective   Objective     Vital Signs:   Temp:  [97.4 °F (36.3 °C)-98.6 °F (37 °C)] 97.7 °F (36.5 °C)  Heart Rate:  [65-70] 70  Resp:  [20-22] 20  BP: (115-138)/(77-98) 115/78        Physical Exam:  NAD, alert and oriented  OP clear, MMM  PERRL  Neck supple  RRR  CTAB  +BS, mild distention, diffusely tender, NG in place  No c/c/e  No rashes  Normal affect    Results Reviewed:  I have personally reviewed current lab, radiology, and data and agree.      Results from last 7 days  Lab Units 10/20/18  0557 10/19/18  2053   WBC 10*3/mm3 12.84* 10.55   HEMOGLOBIN g/dL 15.4 14.9   HEMATOCRIT % 45.1 42.5   PLATELETS 10*3/mm3 192 196       Results from last 7 days  Lab Units 10/20/18  0557 10/19/18  2053   SODIUM mmol/L 138 141   POTASSIUM mmol/L 4.5 4.1   CHLORIDE mmol/L 101 105   CO2 mmol/L 26.0 29.0   BUN mg/dL 15 14   CREATININE mg/dL 0.85 0.95   GLUCOSE mg/dL 117* 112*   CALCIUM mg/dL 9.3 9.6   ALT (SGPT) U/L  --  55*   AST (SGOT) U/L  --  35*     Estimated Creatinine Clearance: 130.4 mL/min (by C-G formula based on SCr of 0.85 mg/dL).  No results found for: BNP    Microbiology Results Abnormal     None          Imaging Results (last 24 hours)     Procedure Component Value Units Date/Time    CT Abdomen Pelvis With Contrast [[id number]] Collected:  10/19/18 2110     Updated:  10/20/18 0050    Narrative:       EXAM:    CT Abdomen and Pelvis With Intravenous Contrast    CLINICAL HISTORY:    48 years old, male; Pain; Abdominal pain; Other: Mid abd pain and n/v; Prior    surgery; Surgery type: Terri; Additional info: Abd pain, h/o sbo    TECHNIQUE:    Axial computed tomography images of the abdomen and pelvis with intravenous   contrast.  All CT scans at this facility use at least one of these dose   optimization techniques: automated exposure control; mA and/or kV adjustment   per patient size (includes targeted exams where dose is matched to clinical   indication); or iterative reconstruction.    Coronal and sagittal reformatted images were created and reviewed.    CONTRAST:    100 mL of isovue 300 administered intravenously.      COMPARISON:    No relevant prior studies available.    FINDINGS:    Lung bases: Calcified granuloma.  No mass.  No consolidation.     ABDOMEN:    Liver:  Subcentimeter hepatic hypodensity, probable cyst.    Gallbladder and bile ducts:  Cholecystectomy.  No ductal dilation.    Pancreas:  Unremarkable.  No mass.  No ductal dilation.    Spleen:  Unremarkable.  No splenomegaly.    Adrenals:  Unremarkable.  No mass.    Kidneys and ureters:  Right renal scarring.  No obstructive uropathy.    Stomach and bowel:  Dilated mid small bowel loops with transition point in   the anterior mid abdomen with air angulated loops of bowel, likely   enteroperitoneal lesions.  No pneumatosis intestinalis.  Small bowel distal to   transition point is decompressed.  Enterocolonic anastomosis, right   hemicolectomy.     PELVIS:    Appendix:  See above.    Bladder:  Unremarkable.  No mass.    Reproductive:  Unremarkable as visualized.     ABDOMEN and PELVIS:    Intraperitoneal space: Trace free fluid adjacent to dilated small bowel   loops..  No free air.  No significant fluid collection.    Bones/joints:  No acute fracture.  No dislocation.    Soft tissues:  Unremarkable.    Vasculature:  Unremarkable.  No abdominal aortic aneurysm.    Lymph nodes:  Unremarkable.  No enlarged lymph nodes.      Impression:       1.  Early versus partial mid to small bowel obstruction in the  right   midabdomen, transition point related to adhesions.  2.  Trace free fluid adjacent to dilated small bowel loops.  No free air.  3.  Incidental findings above.    THIS DOCUMENT HAS BEEN ELECTRONICALLY SIGNED BY NICK FENG MD    XR Chest 1 View [[id number]] Collected:  10/19/18 2046     Updated:  10/19/18 2135    Narrative:       EXAM:    XR Chest, 1 View    CLINICAL HISTORY:    48 years old, male; Signs and symptoms; Other: See notes; Additional info:   Upper abdominal pain triage protocol    TECHNIQUE:    Frontal view of the chest.    COMPARISON:    CR XR CHEST PA AND LATERAL 7/25/2018 4:04 PM    FINDINGS:    Lungs:  Unremarkable.  No consolidation.    Pleural space:  Unremarkable.  No pneumothorax.    Heart:  Unremarkable.  No cardiomegaly.    Mediastinum:  Unremarkable.    Bones/joints:  Unremarkable.      Impression:         No acute abnormality.  No detrimental change from prior exam.    THIS DOCUMENT HAS BEEN ELECTRONICALLY SIGNED BY NICK FENG MD             I have reviewed the medications.      heparin (porcine) 5,000 Units Subcutaneous Q8H   sodium chloride 3 mL Intravenous Q12H         Assessment/Plan   Assessment / Plan     Active Hospital Problems    Diagnosis   • Small bowel obstruction (CMS/HCC)   • HTN (hypertension)   • GERD without esophagitis   • HLD (hyperlipidemia)          Brief Hospital Course to date:  Eitan Royal is a 48 y.o. male here with an SBO      Assessment & Plan:  SBO  --NG to LWS  --surgery consulted, continue IVF  HTN  GERD  HL    DVT Prophylaxis:  BC    CODE STATUS:   Code Status and Medical Interventions:   Ordered at: 10/20/18 0236     Level Of Support Discussed With:    Patient     Code Status:    CPR     Medical Interventions (Level of Support Prior to Arrest):    Full       Disposition: I expect the patient to be discharged TBD.      Electronically signed by Neal Seymour MD, 10/20/18, 8:54 AM.

## 2018-10-21 LAB
ALBUMIN SERPL-MCNC: 3.58 G/DL (ref 3.2–4.8)
ALBUMIN/GLOB SERPL: 2 G/DL (ref 1.5–2.5)
ALP SERPL-CCNC: 54 U/L (ref 25–100)
ALT SERPL W P-5'-P-CCNC: 39 U/L (ref 7–40)
ANION GAP SERPL CALCULATED.3IONS-SCNC: 6 MMOL/L (ref 3–11)
AST SERPL-CCNC: 25 U/L (ref 0–33)
BILIRUB SERPL-MCNC: 1.6 MG/DL (ref 0.3–1.2)
BUN BLD-MCNC: 11 MG/DL (ref 9–23)
BUN/CREAT SERPL: 15.1 (ref 7–25)
CALCIUM SPEC-SCNC: 8 MG/DL (ref 8.7–10.4)
CHLORIDE SERPL-SCNC: 106 MMOL/L (ref 99–109)
CO2 SERPL-SCNC: 24 MMOL/L (ref 20–31)
CREAT BLD-MCNC: 0.73 MG/DL (ref 0.6–1.3)
DEPRECATED RDW RBC AUTO: 42.7 FL (ref 37–54)
ERYTHROCYTE [DISTWIDTH] IN BLOOD BY AUTOMATED COUNT: 12 % (ref 11.3–14.5)
GFR SERPL CREATININE-BSD FRML MDRD: 114 ML/MIN/1.73
GLOBULIN UR ELPH-MCNC: 1.8 GM/DL
GLUCOSE BLD-MCNC: 94 MG/DL (ref 70–100)
HCT VFR BLD AUTO: 38.9 % (ref 38.9–50.9)
HGB BLD-MCNC: 13.3 G/DL (ref 13.1–17.5)
MCH RBC QN AUTO: 33.2 PG (ref 27–31)
MCHC RBC AUTO-ENTMCNC: 34.2 G/DL (ref 32–36)
MCV RBC AUTO: 97 FL (ref 80–99)
PLATELET # BLD AUTO: 153 10*3/MM3 (ref 150–450)
PMV BLD AUTO: 10.3 FL (ref 6–12)
POTASSIUM BLD-SCNC: 3.6 MMOL/L (ref 3.5–5.5)
PROT SERPL-MCNC: 5.4 G/DL (ref 5.7–8.2)
RBC # BLD AUTO: 4.01 10*6/MM3 (ref 4.2–5.76)
SODIUM BLD-SCNC: 136 MMOL/L (ref 132–146)
WBC NRBC COR # BLD: 6.03 10*3/MM3 (ref 3.5–10.8)

## 2018-10-21 PROCEDURE — 25010000002 HYDROMORPHONE 1 MG/ML SOLUTION: Performed by: INTERNAL MEDICINE

## 2018-10-21 PROCEDURE — 99233 SBSQ HOSP IP/OBS HIGH 50: CPT | Performed by: HOSPITALIST

## 2018-10-21 PROCEDURE — 25010000002 HEPARIN (PORCINE) PER 1000 UNITS: Performed by: HOSPITALIST

## 2018-10-21 PROCEDURE — 85027 COMPLETE CBC AUTOMATED: CPT | Performed by: HOSPITALIST

## 2018-10-21 PROCEDURE — 80053 COMPREHEN METABOLIC PANEL: CPT | Performed by: HOSPITALIST

## 2018-10-21 RX ADMIN — HEPARIN SODIUM 5000 UNITS: 5000 INJECTION, SOLUTION INTRAVENOUS; SUBCUTANEOUS at 05:00

## 2018-10-21 RX ADMIN — SODIUM CHLORIDE 200 ML/HR: 9 INJECTION, SOLUTION INTRAVENOUS at 00:36

## 2018-10-21 RX ADMIN — HYDROMORPHONE HYDROCHLORIDE 1 MG: 1 INJECTION, SOLUTION INTRAMUSCULAR; INTRAVENOUS; SUBCUTANEOUS at 04:55

## 2018-10-21 RX ADMIN — HEPARIN SODIUM 5000 UNITS: 5000 INJECTION, SOLUTION INTRAVENOUS; SUBCUTANEOUS at 22:12

## 2018-10-21 RX ADMIN — SODIUM CHLORIDE 200 ML/HR: 9 INJECTION, SOLUTION INTRAVENOUS at 05:23

## 2018-10-21 NOTE — PROGRESS NOTES
Pt feels much better.  No pain since yesterday am.  No nausea.  + Flatus and another BM this am.  WBC and LA now normal.    AF, VSS  Abd osft, nondistended, nontender, nl bowel sounds, NG scant and thin    A/P: SBO resolving   Lactic Acidosis and Leukocytosis resolved    Clamp NG and if tolerates remove later today and start clears.

## 2018-10-21 NOTE — PROGRESS NOTES
[facility] Medicine Services  PROGRESS NOTE    Patient Name: Eitan Royal  : 1970  MRN: [id number]    Date of Admission: 10/19/2018  Length of Stay: 1  Primary Care Physician: Shahram Napier MD    Subjective   Subjective     CC:  Abd pain    HPI:    Pain and cramping better. Pain and bloating better. Sore throat from NG. +Gas/BM. Feels overall better.    Review of Systems    Otherwise ROS is negative except as mentioned in the HPI.    Objective   Objective     Vital Signs:   Temp:  [97.5 °F (36.4 °C)-98.5 °F (36.9 °C)] 97.9 °F (36.6 °C)  Heart Rate:  [55-70] 59  Resp:  [18-20] 18  BP: (118-128)/(74-87) 123/84        Physical Exam:  NAD, alert and oriented  OP clear, MMM  PERRL  Neck supple  RRR  CTAB  +BS, distention resolved, and non-tender  No c/c/e  No rashes  Normal affect    Results Reviewed:  I have personally reviewed current lab, radiology, and data and agree.      Results from last 7 days  Lab Units 10/21/18  0527 10/20/18  0557 10/19/18  2053   WBC 10*3/mm3 6.03 12.84* 10.55   HEMOGLOBIN g/dL 13.3 15.4 14.9   HEMATOCRIT % 38.9 45.1 42.5   PLATELETS 10*3/mm3 153 192 196       Results from last 7 days  Lab Units 10/21/18  0527 10/20/18  0557 10/19/18  2053   SODIUM mmol/L 136 138 141   POTASSIUM mmol/L 3.6 4.5 4.1   CHLORIDE mmol/L 106 101 105   CO2 mmol/L 24.0 26.0 29.0   BUN mg/dL 11 15 14   CREATININE mg/dL 0.73 0.85 0.95   GLUCOSE mg/dL 94 117* 112*   CALCIUM mg/dL 8.0* 9.3 9.6   ALT (SGPT) U/L 39  --  55*   AST (SGOT) U/L 25  --  35*     Estimated Creatinine Clearance: 151.9 mL/min (by C-G formula based on SCr of 0.73 mg/dL).  No results found for: BNP    Microbiology Results Abnormal     None          Imaging Results (last 24 hours)     ** No results found for the last 24 hours. **             I have reviewed the medications.      heparin (porcine) 5,000 Units Subcutaneous Q8H   sodium chloride 3 mL Intravenous Q12H         Assessment/Plan   Assessment / Plan      Active Hospital Problems    Diagnosis   • Small bowel obstruction (CMS/HCC)   • HTN (hypertension)   • GERD without esophagitis   • HLD (hyperlipidemia)          Brief Hospital Course to date:  Eitan Royal is a 48 y.o. male here with an SBO      Assessment & Plan:  SBO  --NG to LWS, clamp today, possibly DC later per surgery  HTN  GERD  HL    DVT Prophylaxis:  BC    CODE STATUS:   Code Status and Medical Interventions:   Ordered at: 10/20/18 0236     Level Of Support Discussed With:    Patient     Code Status:    CPR     Medical Interventions (Level of Support Prior to Arrest):    Full       Disposition: I expect the patient to be discharged TBD.      Electronically signed by Neal Seymour MD, 10/21/18, 8:30 AM.

## 2018-10-21 NOTE — PLAN OF CARE
Problem: Patient Care Overview  Goal: Plan of Care Review  Outcome: Ongoing (interventions implemented as appropriate)   10/20/18 1635 10/20/18 1946 10/21/18 0329   OTHER   Outcome Summary --  --  vss, NG in place, 1x dilaudid at beginning of shift, pt slept well this shift   Coping/Psychosocial   Plan of Care Reviewed With --  patient;spouse --    Plan of Care Review   Progress improving --  --      Goal: Individualization and Mutuality  Outcome: Ongoing (interventions implemented as appropriate)    Goal: Discharge Needs Assessment  Outcome: Ongoing (interventions implemented as appropriate)    Goal: Interprofessional Rounds/Family Conf  Outcome: Ongoing (interventions implemented as appropriate)      Problem: Pain, Acute (Adult)  Goal: Acceptable Pain Control/Comfort Level  Outcome: Ongoing (interventions implemented as appropriate)      Problem: Bowel Obstruction (Adult)  Goal: Signs and Symptoms of Listed Potential Problems Will be Absent, Minimized or Managed (Bowel Obstruction)  Outcome: Ongoing (interventions implemented as appropriate)

## 2018-10-21 NOTE — PLAN OF CARE
Problem: Patient Care Overview  Goal: Plan of Care Review  Outcome: Ongoing (interventions implemented as appropriate)      Problem: Pain, Acute (Adult)  Goal: Acceptable Pain Control/Comfort Level  Outcome: Ongoing (interventions implemented as appropriate)   10/21/18 1609   Pain, Acute (Adult)   Acceptable Pain Control/Comfort Level making progress toward outcome

## 2018-10-22 VITALS
SYSTOLIC BLOOD PRESSURE: 145 MMHG | RESPIRATION RATE: 16 BRPM | HEIGHT: 73 IN | WEIGHT: 224.4 LBS | HEART RATE: 56 BPM | DIASTOLIC BLOOD PRESSURE: 80 MMHG | BODY MASS INDEX: 29.74 KG/M2 | TEMPERATURE: 98.6 F | OXYGEN SATURATION: 96 %

## 2018-10-22 LAB
ALBUMIN SERPL-MCNC: 3.71 G/DL (ref 3.2–4.8)
ALBUMIN/GLOB SERPL: 1.7 G/DL (ref 1.5–2.5)
ALP SERPL-CCNC: 56 U/L (ref 25–100)
ALT SERPL W P-5'-P-CCNC: 34 U/L (ref 7–40)
ANION GAP SERPL CALCULATED.3IONS-SCNC: 7 MMOL/L (ref 3–11)
AST SERPL-CCNC: 25 U/L (ref 0–33)
BILIRUB SERPL-MCNC: 1.3 MG/DL (ref 0.3–1.2)
BUN BLD-MCNC: 7 MG/DL (ref 9–23)
BUN/CREAT SERPL: 9.9 (ref 7–25)
CALCIUM SPEC-SCNC: 8.6 MG/DL (ref 8.7–10.4)
CHLORIDE SERPL-SCNC: 106 MMOL/L (ref 99–109)
CO2 SERPL-SCNC: 24 MMOL/L (ref 20–31)
CREAT BLD-MCNC: 0.71 MG/DL (ref 0.6–1.3)
GFR SERPL CREATININE-BSD FRML MDRD: 117 ML/MIN/1.73
GLOBULIN UR ELPH-MCNC: 2.2 GM/DL
GLUCOSE BLD-MCNC: 91 MG/DL (ref 70–100)
POTASSIUM BLD-SCNC: 3.6 MMOL/L (ref 3.5–5.5)
PROT SERPL-MCNC: 5.9 G/DL (ref 5.7–8.2)
SODIUM BLD-SCNC: 137 MMOL/L (ref 132–146)

## 2018-10-22 PROCEDURE — 99239 HOSP IP/OBS DSCHRG MGMT >30: CPT | Performed by: NURSE PRACTITIONER

## 2018-10-22 PROCEDURE — 80053 COMPREHEN METABOLIC PANEL: CPT | Performed by: HOSPITALIST

## 2018-10-22 NOTE — PROGRESS NOTES
Discharge Planning Assessment  [facility]     Patient Name: Eitan Royal  MRN: [id number]  Today's Date: 10/22/2018    Admit Date: 10/19/2018          Discharge Needs Assessment     Row Name 10/22/18 1218       Living Environment    Lives With spouse    Name(s) of Who Lives With Patient Spouse is Peter    Current Living Arrangements home/apartment/condo   One level home    Primary Care Provided by self    Provides Primary Care For no one    Family Caregiver if Needed spouse    Quality of Family Relationships unable to assess       Resource/Environmental Concerns    Resource/Environmental Concerns none       Transition Planning    Patient/Family Anticipates Transition to home with family    Patient/Family Anticipated Services at Transition none    Transportation Anticipated family or friend will provide       Discharge Needs Assessment    Readmission Within the Last 30 Days no previous admission in last 30 days    Concerns to be Addressed no discharge needs identified    Equipment Currently Used at Home none    Anticipated Changes Related to Illness none    Equipment Needed After Discharge none            Discharge Plan     Row Name 10/22/18 1219       Plan    Plan Home with wife    Patient/Family in Agreement with Plan yes    Plan Comments I spoke with patient this am and plans are to return home with wife. No discharge planning needs identified.     Final Discharge Disposition Code 01 - home or self-care        Destination     No service coordination in this encounter.      Durable Medical Equipment     No service coordination in this encounter.      Dialysis/Infusion     No service coordination in this encounter.      Home Medical Care     No service coordination in this encounter.      Social Care     No service coordination in this encounter.        Expected Discharge Date and Time     Expected Discharge Date Expected Discharge Time    Oct 22, 2018               Demographic Summary     Row Name 10/22/18  1217       General Information    Admission Type inpatient    Referral Source admission list    Preferred Language English    General Information Comments PCP is Shahram Napier       Contact Information    Permission Granted to Share Info With     Contact Information Obtained for     Contact Information Comments [phone]            Functional Status     Row Name 10/22/18 1217       Functional Status    Usual Activity Tolerance excellent    Current Activity Tolerance excellent       Functional Status, IADL    Medications independent    Meal Preparation independent    Housekeeping independent    Laundry independent    Shopping independent       Employment/    Employment/ Comments [employer] through [employer]            Psychosocial    No documentation.           Abuse/Neglect    No documentation.           Legal    No documentation.           Substance Abuse    No documentation.           Patient Forms    No documentation.         Carrie Russell RN

## 2018-10-22 NOTE — PROGRESS NOTES
Pt feels great.  NG out yesterday.  Liquids for dinner with no nausea/vomiting/pain.  + BM and flatus    Abd soft, nontender, nondistended, nl BS    A/p: SBO resolved. Tolerating po. OK to d/c home today.

## 2018-10-22 NOTE — PROGRESS NOTES
[facility]  DISCHARGE SUMMARY    Patient Name: Eitan Royal  : 1970  MRN: [id number]    Date of Admission: 10/19/2018  Date of Discharge:  10/22/2018  Primary Care Physician: Shahram Napier MD    Consults     Date and Time Order Name Status Description    10/20/2018 0427 Inpatient General Surgery Consult Completed         Hospital Course     Presenting Problem:   Small bowel obstruction (CMS/HCC) [K56.609]    Active Hospital Problems    Diagnosis Date Noted   • Small bowel obstruction (CMS/HCC) [K56.609] 10/20/2018   • HTN (hypertension) [I10] 10/20/2018   • GERD without esophagitis [K21.9] 10/20/2018   • HLD (hyperlipidemia) [E78.5] 10/20/2018      Resolved Hospital Problems    Diagnosis Date Noted Date Resolved   No resolved problems to display.          Hospital Course:  Eitan Royal is a 48 y.o. male who presented to [facility] ER on 10/19/18 with c/o severe abdominal pain, around the epigastric area, associated with nausea and vomiting.  In the ER, CT scan of the abdomen/pelvis revealed early partial mild SBO in the right mid abdomen.   The patient was admitted for further care.  Surgery was consulted and has followed the patient throughout his stay.  Condition was thought to be secondary to adhesions from prior abdominal surgery.  NG tube was placed with low wall suction.  Lactic acidosis and leukocytosis present upon admission resolved.  The NG output slowed/cleared and was removed on 10/21/18.  He has since tolerated diet with out any nausea/vomiting.  He has had several bowel movements and abdomen is soft with positive bowel sounds.  He has been cleared by surgery services for discharge home today.  He may advance diet as tolerated.  Follow up with PCP recommended in 1-2 weeks.  Family to transport the patient home.       Day of Discharge     HPI:   Patient sitting up in bed.  Reports he slept well overnight.  Abdominal pain resolved, tolerating diet without  any N/V.  + Gas and BMs.  He is eager to go home today.     Review of Systems   Constitutional: Negative for chills and fever.   HENT: Negative for trouble swallowing.    Respiratory: Negative for shortness of breath.    Cardiovascular: Negative for chest pain.   Gastrointestinal: Negative for diarrhea, nausea and vomiting.     Otherwise ROS is negative except as mentioned in the HPI.    Vital Signs:   Temp:  [97.7 °F (36.5 °C)-98.6 °F (37 °C)] 98.6 °F (37 °C)  Heart Rate:  [56-66] 56  Resp:  [16-18] 16  BP: (125-145)/(80-89) 145/80     Physical Exam:  Constitutional:  NAD, sitting up in bed  HENT:  OP clear, MMM, PERRL  NECK:  Neck supple  HEART: RRR  LUNGS:  CTAB  ABD:  +BS, distention resolved, and non-tender  Musc:  No c/c/e  Skin: No rashes  Neuro:  Alert and oriented   Psych: Normal affect    Pertinent  and/or Most Recent Results       Results from last 7 days  Lab Units 10/22/18  0458 10/21/18  0527 10/20/18  0557 10/19/18  2053   WBC 10*3/mm3  --  6.03 12.84* 10.55   HEMOGLOBIN g/dL  --  13.3 15.4 14.9   HEMATOCRIT %  --  38.9 45.1 42.5   PLATELETS 10*3/mm3  --  153 192 196   SODIUM mmol/L 137 136 138 141   POTASSIUM mmol/L 3.6 3.6 4.5 4.1   CHLORIDE mmol/L 106 106 101 105   CO2 mmol/L 24.0 24.0 26.0 29.0   BUN mg/dL 7* 11 15 14   CREATININE mg/dL 0.71 0.73 0.85 0.95   GLUCOSE mg/dL 91 94 117* 112*   CALCIUM mg/dL 8.6* 8.0* 9.3 9.6       Results from last 7 days  Lab Units 10/22/18  0458 10/21/18  0527 10/19/18  2053   BILIRUBIN mg/dL 1.3* 1.6* 0.8   ALK PHOS U/L 56 54 65   ALT (SGPT) U/L 34 39 55*   AST (SGOT) U/L 25 25 35*           Invalid input(s): TG, LDLCALC, LDLREALC      Brief Urine Lab Results  (Last result in the past 365 days)      Color   Clarity   Blood   Leuk Est   Nitrite   Protein   CREAT   Urine HCG        10/19/18 2345 Yellow Clear Negative Negative Negative Negative               Microbiology Results Abnormal     None          Imaging Results (all)     Procedure Component Value Units  Date/Time    CT Abdomen Pelvis With Contrast [[id number]] Collected:  10/19/18 2110     Updated:  10/20/18 0050    Narrative:       EXAM:    CT Abdomen and Pelvis With Intravenous Contrast    CLINICAL HISTORY:    48 years old, male; Pain; Abdominal pain; Other: Mid abd pain and n/v; Prior   surgery; Surgery type: Terri; Additional info: Abd pain, h/o sbo    TECHNIQUE:    Axial computed tomography images of the abdomen and pelvis with intravenous   contrast.  All CT scans at this facility use at least one of these dose   optimization techniques: automated exposure control; mA and/or kV adjustment   per patient size (includes targeted exams where dose is matched to clinical   indication); or iterative reconstruction.    Coronal and sagittal reformatted images were created and reviewed.    CONTRAST:    100 mL of isovue 300 administered intravenously.      COMPARISON:    No relevant prior studies available.    FINDINGS:    Lung bases: Calcified granuloma.  No mass.  No consolidation.     ABDOMEN:    Liver:  Subcentimeter hepatic hypodensity, probable cyst.    Gallbladder and bile ducts:  Cholecystectomy.  No ductal dilation.    Pancreas:  Unremarkable.  No mass.  No ductal dilation.    Spleen:  Unremarkable.  No splenomegaly.    Adrenals:  Unremarkable.  No mass.    Kidneys and ureters:  Right renal scarring.  No obstructive uropathy.    Stomach and bowel:  Dilated mid small bowel loops with transition point in   the anterior mid abdomen with air angulated loops of bowel, likely   enteroperitoneal lesions.  No pneumatosis intestinalis.  Small bowel distal to   transition point is decompressed.  Enterocolonic anastomosis, right   hemicolectomy.     PELVIS:    Appendix:  See above.    Bladder:  Unremarkable.  No mass.    Reproductive:  Unremarkable as visualized.     ABDOMEN and PELVIS:    Intraperitoneal space: Trace free fluid adjacent to dilated small bowel   loops..  No free air.  No significant fluid collection.     Bones/joints:  No acute fracture.  No dislocation.    Soft tissues:  Unremarkable.    Vasculature:  Unremarkable.  No abdominal aortic aneurysm.    Lymph nodes:  Unremarkable.  No enlarged lymph nodes.      Impression:       1.  Early versus partial mid to small bowel obstruction in the right   midabdomen, transition point related to adhesions.  2.  Trace free fluid adjacent to dilated small bowel loops.  No free air.  3.  Incidental findings above.    THIS DOCUMENT HAS BEEN ELECTRONICALLY SIGNED BY NICK FENG MD    XR Chest 1 View [[id number]] Collected:  10/19/18 2046     Updated:  10/19/18 2135    Narrative:       EXAM:    XR Chest, 1 View    CLINICAL HISTORY:    48 years old, male; Signs and symptoms; Other: See notes; Additional info:   Upper abdominal pain triage protocol    TECHNIQUE:    Frontal view of the chest.    COMPARISON:    CR XR CHEST PA AND LATERAL 7/25/2018 4:04 PM    FINDINGS:    Lungs:  Unremarkable.  No consolidation.    Pleural space:  Unremarkable.  No pneumothorax.    Heart:  Unremarkable.  No cardiomegaly.    Mediastinum:  Unremarkable.    Bones/joints:  Unremarkable.      Impression:         No acute abnormality.  No detrimental change from prior exam.    THIS DOCUMENT HAS BEEN ELECTRONICALLY SIGNED BY NICK FENG MD                         [unfilled]  Discharge Details        Discharge Medications      Continue These Medications      Instructions Start Date   LIPITOR 10 MG tablet  Generic drug:  atorvastatin   Oral, Daily      losartan 25 MG tablet  Commonly known as:  COZAAR   Oral, Daily      omeprazole 20 MG capsule  Commonly known as:  priLOSEC   20 mg, Oral, Daily               Discharge Disposition:  Home or Self Care    Discharge Diet:  Diet Instructions     Diet: Full Liquid; Thin Liquids, No Restrictions       Discharge Diet:  Full Liquid    Fluid Consistency:  Thin Liquids, No Restrictions    Advance as tolerated.          Discharge Activity:   Activity Instructions      Activity as Tolerated               Code Status/Level of Support:  Code Status and Medical Interventions:   Ordered at: 10/20/18 0236     Level Of Support Discussed With:    Patient     Code Status:    CPR     Medical Interventions (Level of Support Prior to Arrest):    Full           Time Spent on Discharge:  35  minutes    Electronically signed by RUDDY Lopez, 10/22/18, 10:57 AM.

## 2018-10-22 NOTE — PLAN OF CARE
Problem: Patient Care Overview  Goal: Plan of Care Review  Outcome: Ongoing (interventions implemented as appropriate)   10/22/18 0357   OTHER   Outcome Summary pt tolerating liquids, positive BM , no N/V, no c/o pain,    Coping/Psychosocial   Plan of Care Reviewed With patient   Plan of Care Review   Progress improving     Goal: Individualization and Mutuality  Outcome: Ongoing (interventions implemented as appropriate)    Goal: Discharge Needs Assessment  Outcome: Ongoing (interventions implemented as appropriate)    Goal: Interprofessional Rounds/Family Conf  Outcome: Ongoing (interventions implemented as appropriate)      Problem: Pain, Acute (Adult)  Goal: Acceptable Pain Control/Comfort Level  Outcome: Ongoing (interventions implemented as appropriate)      Problem: Bowel Obstruction (Adult)  Goal: Signs and Symptoms of Listed Potential Problems Will be Absent, Minimized or Managed (Bowel Obstruction)  Outcome: Ongoing (interventions implemented as appropriate)

## 2018-10-22 NOTE — PAYOR COMM NOTE
"Loida Watts RN Utilization Review [phone]  Fax # [phone]  Ref # [id number]    Please note discharge date.  Authorization still pending.       Eitan Royal (48 y.o. Male)     Date of Birth Social Security Number Address Home Phone MRN    1970  [address] [place] [address] [phone] [id number]    [employer] Status          [place]        Admission Date Admission Type Admitting Provider Attending Provider Department, Room/Bed    10/19/18 Emergency Neal Seymour MD  [facility] 5B, N534/1    Discharge Date Discharge Disposition Discharge Destination        10/22/2018 Home or Self Care              Attending Provider:  (none)   Allergies:  No Known Allergies    Isolation:  None   Infection:  None   Code Status:  CPR    Ht:  185.4 cm (73\")   Wt:  102 kg (224 lb 6.4 oz)    Admission Cmt:  None   Principal Problem:  None                Active Insurance as of 10/19/2018     Primary Coverage     Payor Plan Insurance Group Employer/Plan Group    [employer] BLUE CROSS [facility] [id number]     Payor Plan Address Payor Plan Phone Number Effective From Effective To    [address] [phone] 2008     [facility] [address]       Subscriber Name Subscriber Birth Date Member ID       EITAN ROYAL 1970 [id number]                 Emergency Contacts      (Rel.) Home Phone Work Phone Mobile Phone    Peter Royal (Spouse) -- -- [phone]        Lorena Villalta APRN Nurse Practitioner Signed Medicine  Progress Notes Date of Service: 10/22/2018 10:57 AM                [facility]  DISCHARGE SUMMARY     Patient Name: Eitan Royal  : 1970  MRN: [id number]     Date of Admission: 10/19/2018  Date of Discharge:  10/22/2018  Primary Care Physician: Shahram Napier MD            Consults      Date and Time Order Name Status Description     10/20/2018 0427 Inpatient General " Surgery Consult Completed            Hospital Course      Presenting Problem:   Small bowel obstruction (CMS/HCC) [K56.609]           Active Hospital Problems     Diagnosis Date Noted   • Small bowel obstruction (CMS/HCC) [K56.609] 10/20/2018   • HTN (hypertension) [I10] 10/20/2018   • GERD without esophagitis [K21.9] 10/20/2018   • HLD (hyperlipidemia) [E78.5] 10/20/2018       Resolved Hospital Problems     Diagnosis Date Noted Date Resolved   No resolved problems to display.            Hospital Course:  Eitan Royal is a 48 y.o. male who presented to [facility] ER on 10/19/18 with c/o severe abdominal pain, around the epigastric area, associated with nausea and vomiting.  In the ER, CT scan of the abdomen/pelvis revealed early partial mild SBO in the right mid abdomen.   The patient was admitted for further care.  Surgery was consulted and has followed the patient throughout his stay.  Condition was thought to be secondary to adhesions from prior abdominal surgery.  NG tube was placed with low wall suction.  Lactic acidosis and leukocytosis present upon admission resolved.  The NG output slowed/cleared and was removed on 10/21/18.  He has since tolerated diet with out any nausea/vomiting.  He has had several bowel movements and abdomen is soft with positive bowel sounds.  He has been cleared by surgery services for discharge home today.  He may advance diet as tolerated.  Follow up with PCP recommended in 1-2 weeks.  Family to transport the patient home.         Day of Discharge      HPI:   Patient sitting up in bed.  Reports he slept well overnight.  Abdominal pain resolved, tolerating diet without any N/V.  + Gas and BMs.  He is eager to go home today.      Review of Systems   Constitutional: Negative for chills and fever.   HENT: Negative for trouble swallowing.    Respiratory: Negative for shortness of breath.    Cardiovascular: Negative for chest pain.   Gastrointestinal: Negative for diarrhea, nausea and vomiting.       Otherwise ROS is negative except as mentioned in the HPI.     Vital Signs:   Temp:  [97.7 °F (36.5 °C)-98.6 °F (37 °C)] 98.6 °F (37 °C)  Heart Rate:  [56-66] 56  Resp:  [16-18] 16  BP: (125-145)/(80-89) 145/80      Physical Exam:  Constitutional:  NAD, sitting up in bed  HENT:  OP clear, MMM, PERRL  NECK:  Neck supple  HEART: RRR  LUNGS:  CTAB  ABD:  +BS, distention resolved, and non-tender  Musc:  No c/c/e  Skin: No rashes  Neuro:  Alert and oriented   Psych: Normal affect     Pertinent  and/or Most Recent Results         Results from last 7 days  Lab Units 10/22/18  0458 10/21/18  0527 10/20/18  0557 10/19/18  2053   WBC 10*3/mm3  --  6.03 12.84* 10.55   HEMOGLOBIN g/dL  --  13.3 15.4 14.9   HEMATOCRIT %  --  38.9 45.1 42.5   PLATELETS 10*3/mm3  --  153 192 196   SODIUM mmol/L 137 136 138 141   POTASSIUM mmol/L 3.6 3.6 4.5 4.1   CHLORIDE mmol/L 106 106 101 105   CO2 mmol/L 24.0 24.0 26.0 29.0   BUN mg/dL 7* 11 15 14   CREATININE mg/dL 0.71 0.73 0.85 0.95   GLUCOSE mg/dL 91 94 117* 112*   CALCIUM mg/dL 8.6* 8.0* 9.3 9.6         Results from last 7 days  Lab Units 10/22/18  0458 10/21/18  0527 10/19/18  2053   BILIRUBIN mg/dL 1.3* 1.6* 0.8   ALK PHOS U/L 56 54 65   ALT (SGPT) U/L 34 39 55*   AST (SGOT) U/L 25 25 35*             Invalid input(s): TG, LDLCALC, LDLREALC                           Brief Urine Lab Results  (Last result in the past 365 days)       Color   Clarity   Blood   Leuk Est   Nitrite   Protein   CREAT   Urine HCG         10/19/18 2345 Yellow Clear Negative Negative Negative Negative                           Microbiology Results Abnormal      None                     Imaging Results (all)      Procedure Component Value Units Date/Time     CT Abdomen Pelvis With Contrast [[id number]] Collected:  10/19/18 2110       Updated:  10/20/18 0050     Narrative:        EXAM:    CT Abdomen and Pelvis With Intravenous Contrast     CLINICAL HISTORY:    48 years old, male; Pain; Abdominal pain; Other: Mid  abd pain and n/v; Prior   surgery; Surgery type: Terri; Additional info: Abd pain, h/o sbo     TECHNIQUE:    Axial computed tomography images of the abdomen and pelvis with intravenous   contrast.  All CT scans at this facility use at least one of these dose   optimization techniques: automated exposure control; mA and/or kV adjustment   per patient size (includes targeted exams where dose is matched to clinical   indication); or iterative reconstruction.    Coronal and sagittal reformatted images were created and reviewed.     CONTRAST:    100 mL of isovue 300 administered intravenously.       COMPARISON:    No relevant prior studies available.     FINDINGS:    Lung bases: Calcified granuloma.  No mass.  No consolidation.      ABDOMEN:    Liver:  Subcentimeter hepatic hypodensity, probable cyst.    Gallbladder and bile ducts:  Cholecystectomy.  No ductal dilation.    Pancreas:  Unremarkable.  No mass.  No ductal dilation.    Spleen:  Unremarkable.  No splenomegaly.    Adrenals:  Unremarkable.  No mass.    Kidneys and ureters:  Right renal scarring.  No obstructive uropathy.    Stomach and bowel:  Dilated mid small bowel loops with transition point in   the anterior mid abdomen with air angulated loops of bowel, likely   enteroperitoneal lesions.  No pneumatosis intestinalis.  Small bowel distal to   transition point is decompressed.  Enterocolonic anastomosis, right   hemicolectomy.      PELVIS:    Appendix:  See above.    Bladder:  Unremarkable.  No mass.    Reproductive:  Unremarkable as visualized.      ABDOMEN and PELVIS:    Intraperitoneal space: Trace free fluid adjacent to dilated small bowel   loops..  No free air.  No significant fluid collection.    Bones/joints:  No acute fracture.  No dislocation.    Soft tissues:  Unremarkable.    Vasculature:  Unremarkable.  No abdominal aortic aneurysm.    Lymph nodes:  Unremarkable.  No enlarged lymph nodes.        Impression:        1.  Early versus partial mid to  small bowel obstruction in the right   midabdomen, transition point related to adhesions.  2.  Trace free fluid adjacent to dilated small bowel loops.  No free air.  3.  Incidental findings above.     THIS DOCUMENT HAS BEEN ELECTRONICALLY SIGNED BY NICK FENG MD     XR Chest 1 View [[id number]] Collected:  10/19/18 2046       Updated:  10/19/18 2135     Narrative:        EXAM:    XR Chest, 1 View     CLINICAL HISTORY:    48 years old, male; Signs and symptoms; Other: See notes; Additional info:   Upper abdominal pain triage protocol     TECHNIQUE:    Frontal view of the chest.     COMPARISON:    CR XR CHEST PA AND LATERAL 7/25/2018 4:04 PM     FINDINGS:    Lungs:  Unremarkable.  No consolidation.    Pleural space:  Unremarkable.  No pneumothorax.    Heart:  Unremarkable.  No cardiomegaly.    Mediastinum:  Unremarkable.    Bones/joints:  Unremarkable.        Impression:          No acute abnormality.  No detrimental change from prior exam.     THIS DOCUMENT HAS BEEN ELECTRONICALLY SIGNED BY NICK FENG MD                      [unfilled]  Discharge Details                 Discharge Medications             Continue These Medications      Instructions Start Date   LIPITOR 10 MG tablet  Generic drug:  atorvastatin    Oral, Daily        losartan 25 MG tablet  Commonly known as:  COZAAR    Oral, Daily        omeprazole 20 MG capsule  Commonly known as:  priLOSEC    20 mg, Oral, Daily                     Discharge Disposition:  Home or Self Care     Discharge Diet:      Diet Instructions      Diet: Full Liquid; Thin Liquids, No Restrictions        Discharge Diet:  Full Liquid     Fluid Consistency:  Thin Liquids, No Restrictions     Advance as tolerated.             Discharge Activity:       Activity Instructions      Activity as Tolerated                   Code Status/Level of Support:      Code Status and Medical Interventions:   Ordered at: 10/20/18 0236     Level Of Support Discussed With:     Patient     Code Status:      CPR     Medical Interventions (Level of Support Prior to Arrest):     Full               Time Spent on Discharge:  35  minutes     Electronically signed by Lorena Villalta, RUDDY, 10/22/18, 10:57 AM.                 Discharge Order     Start     Ordered    10/22/18 1056  Discharge patient  Once     Expected Discharge Date:  10/22/18    Discharge Disposition:  Home or Self Care    Physician of Record for Attribution - Please select from Treatment Team:  MP CASPER [[id number]]    Review needed by CMO to determine Physician of Record:  No       Question Answer Comment   Physician of Record for Attribution - Please select from Treatment Team MP CASPER    Review needed by CMO to determine Physician of Record No        10/22/18 1050

## 2019-02-22 RX ORDER — ATORVASTATIN CALCIUM 10 MG/1
TABLET, FILM COATED ORAL
Qty: 30 TABLET | Refills: 5 | Status: SHIPPED | OUTPATIENT
Start: 2019-02-22 | End: 2019-11-14 | Stop reason: SDUPTHER

## 2019-02-22 RX ORDER — OMEPRAZOLE 40 MG/1
CAPSULE, DELAYED RELEASE ORAL
Qty: 30 CAPSULE | Refills: 5 | OUTPATIENT
Start: 2019-02-22

## 2019-03-12 ENCOUNTER — TELEPHONE (OUTPATIENT)
Dept: INTERNAL MEDICINE | Facility: CLINIC | Age: 49
End: 2019-03-12

## 2019-03-12 RX ORDER — LISINOPRIL 5 MG/1
5 TABLET ORAL DAILY
Qty: 90 TABLET | Refills: 3 | Status: SHIPPED | OUTPATIENT
Start: 2019-03-12 | End: 2020-04-09

## 2019-03-12 NOTE — TELEPHONE ENCOUNTER
PATIENT CALLED AND STATED HE GOT A LETTER FROM BRADY THAT THERE HAD BEEN A RECALL ON THE LOSARTAN HE IS TAKING.    PLEASE ADVISE

## 2019-03-22 RX ORDER — OMEPRAZOLE 40 MG/1
CAPSULE, DELAYED RELEASE ORAL
Qty: 90 CAPSULE | Refills: 0 | Status: SHIPPED | OUTPATIENT
Start: 2019-03-22 | End: 2019-04-22 | Stop reason: SDUPTHER

## 2019-04-19 RX ORDER — OMEPRAZOLE 40 MG/1
CAPSULE, DELAYED RELEASE ORAL
Qty: 90 CAPSULE | Refills: 0 | OUTPATIENT
Start: 2019-04-19

## 2019-04-22 RX ORDER — OMEPRAZOLE 40 MG/1
CAPSULE, DELAYED RELEASE ORAL
Qty: 90 CAPSULE | Refills: 3 | Status: SHIPPED | OUTPATIENT
Start: 2019-04-22 | End: 2020-08-04

## 2019-05-06 PROBLEM — E66.9 OBESITY (BMI 30.0-34.9): Status: ACTIVE | Noted: 2019-05-06

## 2019-05-06 PROBLEM — E78.00 HYPERCHOLESTEROLEMIA: Status: ACTIVE | Noted: 2019-05-06

## 2019-05-06 PROBLEM — R03.0 ELEVATED BLOOD PRESSURE READING: Status: ACTIVE | Noted: 2019-05-06

## 2019-05-06 PROBLEM — R79.89 ABNORMAL LIVER FUNCTION TESTS: Status: ACTIVE | Noted: 2019-05-06

## 2019-05-06 PROBLEM — M17.10 PRIMARY LOCALIZED OSTEOARTHROSIS, LOWER LEG: Status: ACTIVE | Noted: 2019-05-06

## 2019-05-06 PROBLEM — K21.9 HIATAL HERNIA WITH GERD: Status: ACTIVE | Noted: 2019-05-06

## 2019-05-06 PROBLEM — H00.013 HORDEOLUM OF RIGHT EYE: Status: ACTIVE | Noted: 2019-05-06

## 2019-05-06 PROBLEM — R05.9 COUGH: Status: ACTIVE | Noted: 2019-05-06

## 2019-05-06 PROBLEM — N40.0 HYPERTROPHY OF PROSTATE WITHOUT URINARY OBSTRUCTION: Status: ACTIVE | Noted: 2019-05-06

## 2019-05-06 PROBLEM — E78.2 MIXED HYPERLIPIDEMIA: Status: ACTIVE | Noted: 2019-05-06

## 2019-05-06 PROBLEM — I10 BENIGN ESSENTIAL HYPERTENSION: Status: ACTIVE | Noted: 2019-05-06

## 2019-05-06 PROBLEM — E66.811 OBESITY (BMI 30.0-34.9): Status: ACTIVE | Noted: 2019-05-06

## 2019-05-06 PROBLEM — K58.9 IRRITABLE BOWEL SYNDROME: Status: ACTIVE | Noted: 2019-05-06

## 2019-05-06 PROBLEM — K44.9 HIATAL HERNIA WITH GERD: Status: ACTIVE | Noted: 2019-05-06

## 2019-06-11 ENCOUNTER — OFFICE VISIT (OUTPATIENT)
Dept: INTERNAL MEDICINE | Facility: CLINIC | Age: 49
End: 2019-06-11

## 2019-06-11 VITALS
BODY MASS INDEX: 30.62 KG/M2 | DIASTOLIC BLOOD PRESSURE: 70 MMHG | HEIGHT: 73 IN | WEIGHT: 231 LBS | HEART RATE: 64 BPM | SYSTOLIC BLOOD PRESSURE: 112 MMHG

## 2019-06-11 DIAGNOSIS — E66.9 OBESITY (BMI 30.0-34.9): ICD-10-CM

## 2019-06-11 DIAGNOSIS — R05.9 COUGH: ICD-10-CM

## 2019-06-11 DIAGNOSIS — I10 BENIGN ESSENTIAL HYPERTENSION: Primary | ICD-10-CM

## 2019-06-11 DIAGNOSIS — E78.2 MIXED HYPERLIPIDEMIA: ICD-10-CM

## 2019-06-11 DIAGNOSIS — Z12.5 SPECIAL SCREENING FOR MALIGNANT NEOPLASM OF PROSTATE: ICD-10-CM

## 2019-06-11 DIAGNOSIS — J30.2 SEASONAL ALLERGIES: ICD-10-CM

## 2019-06-11 PROCEDURE — 99396 PREV VISIT EST AGE 40-64: CPT | Performed by: INTERNAL MEDICINE

## 2019-06-11 NOTE — ASSESSMENT & PLAN NOTE
Combination allergy cough and reflux esophagitis cough currently stable on omeprazole 40 mg no change in therapy.

## 2019-06-11 NOTE — ASSESSMENT & PLAN NOTE
Patient is overweight but has a large frame his weight is 231 pounds his stated BMI is 30.5 it would be suggested to lose 15 pounds to 215 with a reduced carb and smaller portion diet

## 2019-06-11 NOTE — ASSESSMENT & PLAN NOTE
Blood pressure currently stable on lisinopril 5 mg daily blood pressure 112/70 left and right arm sitting

## 2019-06-11 NOTE — PROGRESS NOTES
[facility]     Eitan Royal  1970   [id number]      Patient Care Team:  Shahram Napier MD as PCP - General (Internal Medicine)    Chief Complaint::   Chief Complaint   Patient presents with   • Annual Exam          HPI  He is a 50 yo male presenting for an annual wellness vsit. He hasn't done well with his diet recently. He states he doesn't eat bad but he eats too much. He has been having allergies and takes Allegra. He says it now is clearing up. The omeprazole helps with any dry cough he gets occasionally. No injuries at work.  He had a hearing test last week that was ok.  He is taking his medications regularly.  He is due for a colonoscopy and he will set up an appointment.       Patient Active Problem List   Diagnosis   • Small bowel obstruction (CMS/HCC)   • GERD without esophagitis   • Mixed hyperlipidemia   • Abnormal liver function tests   • Benign essential hypertension   • Cough   • Elevated blood pressure reading   • Hiatal hernia with GERD   • Hordeolum of right eye   • Hypercholesterolemia   • Hypertrophy of prostate without urinary obstruction   • Irritable bowel syndrome   • Obesity (BMI 30.0-34.9)   • Primary localized osteoarthrosis, lower leg   • Seasonal allergies        Past Medical History:   Diagnosis Date   • Appendiceal abscess 04/2004    POST    • Dumping syndrome 2009   • GERD (gastroesophageal reflux disease)    • Hyperlipidemia    • Hypertension    • Left ACL tear 1986   • Pseudomembranous colitis 05/2004   • Right ACL tear 1996       Past Surgical History:   Procedure Laterality Date   • APPENDECTOMY  04/2004   • CHOLECYSTECTOMY  2005   • COLON SURGERY  04/2004    resection   • COLONOSCOPY     • EXPLORATORY LAPAROTOMY     • KNEE ACL RECONSTRUCTION Right 1996   • KNEE ARTHROSCOPY Left 1986   • KNEE CARTILAGE SURGERY      ACL and MCL       Family History   Problem Relation Age of Onset   • Osteoarthritis Father    • Heart disease Maternal Grandfather        Social  "History     Socioeconomic History   • Marital status:      Spouse name: Not on file   • Number of children: Not on file   • Years of education: Not on file   • Highest education level: Not on file   Tobacco Use   • Smoking status: Former Smoker     Packs/day: 0.50     Years: 10.00     Pack years: 5.00     Types: Cigarettes     Last attempt to quit:      Years since quittin.4   • Smokeless tobacco: Current User   Substance and Sexual Activity   • Alcohol use: Yes     Alcohol/week: 3.6 oz     Types: 6 Cans of beer per week   • Drug use: No   • Sexual activity: Defer       Allergies   Allergen Reactions   • Belladonna Alkaloids Unknown (See Comments)     Donnatal   • Phenobarbital Unknown (See Comments)     Donnatal       Immunization History   Administered Date(s) Administered   • Tdap 2015        Health Maintenance Due   Topic Date Due   • ZOSTER VACCINE (1 of 2) 2018   • LIPID PANEL  2019   • COLONOSCOPY  2019        Review of Systems   Constitutional: Negative for chills, fatigue and fever.   HENT: Negative for congestion, ear pain and sinus pressure.    Respiratory: Negative for cough, chest tightness, shortness of breath and wheezing.    Cardiovascular: Negative for chest pain and palpitations.   Gastrointestinal: Negative for abdominal pain, blood in stool and constipation.   Musculoskeletal: Negative for back pain.   Skin: Negative for color change.   Allergic/Immunologic: Negative for environmental allergies.   Neurological: Negative for dizziness, speech difficulty and headache.   Psychiatric/Behavioral: Negative for decreased concentration. The patient is not nervous/anxious.         Vital Signs  Vitals:    19 1600 19 1628 19 1629   BP: 110/76 110/70 112/70   BP Location:  Right arm Left arm   Patient Position:  Sitting Sitting   Cuff Size:  Adult Adult   Pulse: 64     Weight: 105 kg (231 lb)     Height: 185.4 cm (73\")     PainSc: 0-No pain       Body " "mass index is 30.48 kg/m².      Current Outpatient Medications:   •  atorvastatin (LIPITOR) 10 MG tablet, TAKE 1 TABLET BY MOUTH ONCE DAILY, Disp: 30 tablet, Rfl: 5  •  lisinopril (PRINIVIL,ZESTRIL) 5 MG tablet, Take 1 tablet by mouth Daily., Disp: 90 tablet, Rfl: 3  •  omeprazole (priLOSEC) 40 MG capsule, TAKE 1 CAPSULE BY MOUTH ONCE DAILY BEFORE  A  MEAL, Disp: 90 capsule, Rfl: 3    Physical Exam   Constitutional: He is oriented to person, place, and time.   HENT:   Head: Normocephalic.   Nose: Nose normal.   Eyes: Conjunctivae are normal. Pupils are equal, round, and reactive to light.   Bilateral lower lid redness from allergy irritation   Neck: Normal range of motion. Carotid bruit is not present. No thyroid mass and no thyromegaly present.   Cardiovascular: Normal rate, regular rhythm and normal heart sounds.   No murmur heard.  Pulmonary/Chest: Effort normal and breath sounds normal.   Abdominal: Soft. Bowel sounds are normal. There is no splenomegaly or hepatomegaly. There is no tenderness.   Musculoskeletal: Normal range of motion. He exhibits no edema.     Vascular Status -  His right foot exhibits normal foot vasculature  and no edema. His left foot exhibits normal foot vasculature  and no edema.  Neurological: He is alert and oriented to person, place, and time.   Skin: Skin is warm and dry.   Psychiatric: He has a normal mood and affect. His behavior is normal.         Results Review:    Patient will return for fasting lab after July 17  Procedures    Medication Review: Medications reviewed and noted    Patient wellness counseling  Exercise: Encouraged walking exercise activity  Diet: Encouraged reduced carbohydrate and healthy cardiac diet  Smoking: Non-smoker  Alcohol: None alcohol  Screening: Received letter from colorectal surgeons regards repeat colonoscopy \"is been 10 years    Assessment/Plan:  Problem List Items Addressed This Visit        Cardiovascular and Mediastinum    Mixed hyperlipidemia    " Current Assessment & Plan     Patient has history of mixed hyperlipidemia on atorvastatin 10 mg denies myalgia arthralgia will return for fasting lab         Relevant Medications    atorvastatin (LIPITOR) 10 MG tablet    Other Relevant Orders    CBC Auto Differential    Comprehensive Metabolic Panel    Lipid Panel    TSH    Benign essential hypertension - Primary    Current Assessment & Plan     Blood pressure currently stable on lisinopril 5 mg daily blood pressure 112/70 left and right arm sitting         Relevant Medications    lisinopril (PRINIVIL,ZESTRIL) 5 MG tablet    Other Relevant Orders    MicroAlbumin, Urine, Random - Urine, Clean Catch       Respiratory    Cough    Current Assessment & Plan     Combination allergy cough and reflux esophagitis cough currently stable on omeprazole 40 mg no change in therapy.            Other    Obesity (BMI 30.0-34.9)    Current Assessment & Plan     Patient is overweight but has a large frame his weight is 231 pounds his stated BMI is 30.5 it would be suggested to lose 15 pounds to 215 with a reduced carb and smaller portion diet         Seasonal allergies    Current Assessment & Plan     Mild treated with antihistamine-decongestant currently therapy stable           Other Visit Diagnoses     Special screening for malignant neoplasm of prostate        Relevant Orders    PSA Screen           Patient Instructions   Suggest walking exercise  Suggest healthy cardiac diet with reduced carbs smaller portions  Return July 17 for fasting labs  Continue current medications  Visit in 6 months or as needed       CMP:  Lab Results   Component Value Date    BUN 7 (L) 10/22/2018    CREATININE 0.71 10/22/2018    EGFRIFNONA 117 10/22/2018    BCR 9.9 10/22/2018     10/22/2018    K 3.6 10/22/2018    CO2 24.0 10/22/2018    CALCIUM 8.6 (L) 10/22/2018    ALBUMIN 3.71 10/22/2018    BILITOT 1.3 (H) 10/22/2018    ALKPHOS 56 10/22/2018    AST 25 10/22/2018    ALT 34 10/22/2018      HbA1c:  No results found for: HGBA1C  Microalbumin:  No results found for: MICROALBUR, POCMALB, POCCREAT  Lipid Panel  Lab Results   Component Value Date    AST 25 10/22/2018    ALT 34 10/22/2018        Counseling was given to patient for the following topics: appropriate exercise healthy eating habits.    Plan of care reviewed with patient at the conclusion of today's visit. Education was provided regarding diagnosis, management, and any prescribed or recommended OTC medications.Patient verbalizes understanding of and agreement with management plan.         Shahram Napier MD    Note: Part of this note may be an electronic transcription/translation of spoken language to printed text using Dragon Dictation System.

## 2019-06-11 NOTE — ASSESSMENT & PLAN NOTE
Patient has history of mixed hyperlipidemia on atorvastatin 10 mg denies myalgia arthralgia will return for fasting lab

## 2019-06-12 NOTE — PATIENT INSTRUCTIONS
Suggest walking exercise  Suggest healthy cardiac diet with reduced carbs smaller portions  Return July 17 for fasting labs  Continue current medications  Visit in 6 months or as needed

## 2019-09-19 ENCOUNTER — OFFICE VISIT (OUTPATIENT)
Dept: INTERNAL MEDICINE | Facility: CLINIC | Age: 49
End: 2019-09-19

## 2019-09-19 ENCOUNTER — LAB (OUTPATIENT)
Dept: LAB | Facility: HOSPITAL | Age: 49
End: 2019-09-19

## 2019-09-19 VITALS
TEMPERATURE: 97.1 F | DIASTOLIC BLOOD PRESSURE: 90 MMHG | WEIGHT: 231 LBS | HEIGHT: 73 IN | HEART RATE: 56 BPM | BODY MASS INDEX: 30.62 KG/M2 | SYSTOLIC BLOOD PRESSURE: 128 MMHG

## 2019-09-19 DIAGNOSIS — I10 BENIGN ESSENTIAL HYPERTENSION: ICD-10-CM

## 2019-09-19 DIAGNOSIS — E78.2 MIXED HYPERLIPIDEMIA: ICD-10-CM

## 2019-09-19 DIAGNOSIS — K44.9 HIATAL HERNIA WITH GERD: ICD-10-CM

## 2019-09-19 DIAGNOSIS — K21.9 HIATAL HERNIA WITH GERD: ICD-10-CM

## 2019-09-19 DIAGNOSIS — G43.C0 PERIODIC HEADACHE SYNDROME, NOT INTRACTABLE: ICD-10-CM

## 2019-09-19 DIAGNOSIS — I10 BENIGN ESSENTIAL HYPERTENSION: Primary | ICD-10-CM

## 2019-09-19 DIAGNOSIS — Z12.5 SPECIAL SCREENING FOR MALIGNANT NEOPLASM OF PROSTATE: ICD-10-CM

## 2019-09-19 LAB
ALBUMIN SERPL-MCNC: 4.6 G/DL (ref 3.5–5.2)
ALBUMIN/GLOB SERPL: 1.7 G/DL
ALP SERPL-CCNC: 79 U/L (ref 39–117)
ALT SERPL W P-5'-P-CCNC: 42 U/L (ref 1–41)
ANION GAP SERPL CALCULATED.3IONS-SCNC: 12.8 MMOL/L (ref 5–15)
AST SERPL-CCNC: 30 U/L (ref 1–40)
BASOPHILS # BLD AUTO: 0.03 10*3/MM3 (ref 0–0.2)
BASOPHILS NFR BLD AUTO: 0.5 % (ref 0–1.5)
BILIRUB SERPL-MCNC: 1.2 MG/DL (ref 0.2–1.2)
BUN BLD-MCNC: 10 MG/DL (ref 6–20)
BUN/CREAT SERPL: 9.1 (ref 7–25)
CALCIUM SPEC-SCNC: 9.7 MG/DL (ref 8.6–10.5)
CHLORIDE SERPL-SCNC: 100 MMOL/L (ref 98–107)
CHOLEST SERPL-MCNC: 168 MG/DL (ref 0–200)
CO2 SERPL-SCNC: 27.2 MMOL/L (ref 22–29)
CREAT BLD-MCNC: 1.1 MG/DL (ref 0.76–1.27)
DEPRECATED RDW RBC AUTO: 42.6 FL (ref 37–54)
EOSINOPHIL # BLD AUTO: 0.09 10*3/MM3 (ref 0–0.4)
EOSINOPHIL NFR BLD AUTO: 1.6 % (ref 0.3–6.2)
ERYTHROCYTE [DISTWIDTH] IN BLOOD BY AUTOMATED COUNT: 12.4 % (ref 12.3–15.4)
GFR SERPL CREATININE-BSD FRML MDRD: 71 ML/MIN/1.73
GLOBULIN UR ELPH-MCNC: 2.7 GM/DL
GLUCOSE BLD-MCNC: 90 MG/DL (ref 65–99)
HCT VFR BLD AUTO: 47.2 % (ref 37.5–51)
HDLC SERPL-MCNC: 61 MG/DL (ref 40–60)
HGB BLD-MCNC: 15.9 G/DL (ref 13–17.7)
IMM GRANULOCYTES # BLD AUTO: 0.02 10*3/MM3 (ref 0–0.05)
IMM GRANULOCYTES NFR BLD AUTO: 0.4 % (ref 0–0.5)
LDLC SERPL CALC-MCNC: 85 MG/DL (ref 0–100)
LDLC/HDLC SERPL: 1.39 {RATIO}
LYMPHOCYTES # BLD AUTO: 1.84 10*3/MM3 (ref 0.7–3.1)
LYMPHOCYTES NFR BLD AUTO: 33.3 % (ref 19.6–45.3)
MCH RBC QN AUTO: 31.9 PG (ref 26.6–33)
MCHC RBC AUTO-ENTMCNC: 33.7 G/DL (ref 31.5–35.7)
MCV RBC AUTO: 94.6 FL (ref 79–97)
MONOCYTES # BLD AUTO: 0.43 10*3/MM3 (ref 0.1–0.9)
MONOCYTES NFR BLD AUTO: 7.8 % (ref 5–12)
NEUTROPHILS # BLD AUTO: 3.11 10*3/MM3 (ref 1.7–7)
NEUTROPHILS NFR BLD AUTO: 56.4 % (ref 42.7–76)
NRBC BLD AUTO-RTO: 0 /100 WBC (ref 0–0.2)
PLATELET # BLD AUTO: 217 10*3/MM3 (ref 140–450)
PMV BLD AUTO: 10.2 FL (ref 6–12)
POTASSIUM BLD-SCNC: 4.9 MMOL/L (ref 3.5–5.2)
PROT SERPL-MCNC: 7.3 G/DL (ref 6–8.5)
PSA SERPL-MCNC: 0.68 NG/ML (ref 0–4)
RBC # BLD AUTO: 4.99 10*6/MM3 (ref 4.14–5.8)
SODIUM BLD-SCNC: 140 MMOL/L (ref 136–145)
TRIGL SERPL-MCNC: 110 MG/DL (ref 0–150)
TSH SERPL DL<=0.05 MIU/L-ACNC: 1.93 UIU/ML (ref 0.27–4.2)
VLDLC SERPL-MCNC: 22 MG/DL (ref 5–40)
WBC NRBC COR # BLD: 5.52 10*3/MM3 (ref 3.4–10.8)

## 2019-09-19 PROCEDURE — 99213 OFFICE O/P EST LOW 20 MIN: CPT | Performed by: INTERNAL MEDICINE

## 2019-09-19 PROCEDURE — G0103 PSA SCREENING: HCPCS

## 2019-09-19 PROCEDURE — 36415 COLL VENOUS BLD VENIPUNCTURE: CPT

## 2019-09-19 PROCEDURE — 80061 LIPID PANEL: CPT

## 2019-09-19 PROCEDURE — 84443 ASSAY THYROID STIM HORMONE: CPT

## 2019-09-19 PROCEDURE — 80053 COMPREHEN METABOLIC PANEL: CPT

## 2019-09-19 PROCEDURE — 82043 UR ALBUMIN QUANTITATIVE: CPT

## 2019-09-19 PROCEDURE — 85025 COMPLETE CBC W/AUTO DIFF WBC: CPT

## 2019-09-19 RX ORDER — SUMATRIPTAN 100 MG/1
TABLET, FILM COATED ORAL
Qty: 9 TABLET | Refills: 1 | Status: SHIPPED | OUTPATIENT
Start: 2019-09-19

## 2019-09-19 NOTE — PATIENT INSTRUCTIONS
Prescription written for Imitrex 100 mg 1 tablet as needed #9  Encouraged to go ahead and get the fasting lab work that was originally proposed for this day  Return regular exam on December 12 or as needed  Notify us if the headache persists or changes.

## 2019-09-19 NOTE — PROGRESS NOTES
[facility]     Eitan Royal  1970   [id number]      Patient Care Team:  Shahram Napier MD as PCP - General (Internal Medicine)    Chief Complaint::   Chief Complaint   Patient presents with   • Headache     Says migraine that started last pm.  worse than he has had before.  Pain is back of head.  Described pain as 10/10 on pain scale.  Did subside, with advil migraine and drinking Coke, but feels like coming back.  Pain now 2or 3/10. Denies nausea, but did have some visual problems.              HPI  Patient 49-year-old male with a history of hypertension mixed hyperlipidemia GERD complains of infrequent headache but a headache that began on the evening of September 18 has persisted he took Advil migraine at approximately 1 AM minor relief is in the office this day.      Patient Active Problem List   Diagnosis   • Small bowel obstruction (CMS/HCC)   • GERD without esophagitis   • Mixed hyperlipidemia   • Abnormal liver function tests   • Benign essential hypertension   • Cough   • Hiatal hernia with GERD   • Hordeolum of right eye   • Hypertrophy of prostate without urinary obstruction   • Irritable bowel syndrome   • Obesity (BMI 30.0-34.9)   • Primary localized osteoarthrosis, lower leg   • Seasonal allergies   • Periodic headache syndrome, not intractable        Past Medical History:   Diagnosis Date   • Appendiceal abscess 04/2004    POST    • Dumping syndrome 2009   • GERD (gastroesophageal reflux disease)    • Hyperlipidemia    • Hypertension    • Left ACL tear 1986   • Pseudomembranous colitis 05/2004   • Right ACL tear 1996       Past Surgical History:   Procedure Laterality Date   • APPENDECTOMY  04/2004   • CHOLECYSTECTOMY  2005   • COLON SURGERY  04/2004    resection   • COLONOSCOPY     • EXPLORATORY LAPAROTOMY     • KNEE ACL RECONSTRUCTION Right 1996   • KNEE ARTHROSCOPY Left 1986   • KNEE CARTILAGE SURGERY      ACL and MCL       Family History   Problem Relation Age of Onset   •  "Osteoarthritis Father    • Heart disease Maternal Grandfather        Social History     Socioeconomic History   • Marital status:      Spouse name: Not on file   • Number of children: Not on file   • Years of education: Not on file   • Highest education level: Not on file   Tobacco Use   • Smoking status: Former Smoker     Packs/day: 0.50     Years: 10.00     Pack years: 5.00     Types: Cigarettes     Last attempt to quit:      Years since quittin.7   • Smokeless tobacco: Former User     Quit date: 2019   Substance and Sexual Activity   • Alcohol use: Yes     Alcohol/week: 2.4 oz     Types: 4 Cans of beer per week   • Drug use: No   • Sexual activity: Defer       Allergies   Allergen Reactions   • Belladonna Alkaloids Unknown (See Comments)     Donnatal   • Phenobarbital Unknown (See Comments)     Donnatal       Review of Systems     HEENT: Complains of migraine headache denies visual changes hearing nose mouth or throat difficulty  NECK: Denies dysphasia  CHEST: Non-smoker denies cough or wheeze  CARDIAC: Denies chest pain or palpitations  ABD: Minor nausea denies vomiting  : Denies dysuria  NEURO: Complains of migraine headache beginning posterior  PSYCH: Denies stress anxiety  EXTREM: Mild arthritic soreness    Vital Signs  Vitals:    19 1135   BP: 128/90   BP Location: Left arm   Patient Position: Sitting   Pulse: 56   Temp: 97.1 °F (36.2 °C)   Weight: 105 kg (231 lb)   Height: 185.4 cm (73\")   PainSc:   3   PainLoc: Head     Body mass index is 30.48 kg/m².    Current Outpatient Medications:   •  atorvastatin (LIPITOR) 10 MG tablet, TAKE 1 TABLET BY MOUTH ONCE DAILY, Disp: 30 tablet, Rfl: 5  •  lisinopril (PRINIVIL,ZESTRIL) 5 MG tablet, Take 1 tablet by mouth Daily., Disp: 90 tablet, Rfl: 3  •  omeprazole (priLOSEC) 40 MG capsule, TAKE 1 CAPSULE BY MOUTH ONCE DAILY BEFORE  A  MEAL, Disp: 90 capsule, Rfl: 3  •  SUMAtriptan (IMITREX) 100 MG tablet, Take one tablet at onset of headache. " May repeat dose one time in 2 hours if headache not relieved., Disp: 9 tablet, Rfl: 1    Physical Exam     ACE III MINI         HEENT: He was equal reactive ENT clear no facial asymmetry pharynx is clear  NECK: No masses bruits thyromegaly or neck vein distention  CHEST: Clear  CARDIAC: Regular rhythm without gallop or rub  ABD: Liver and spleen are normal  :   NEURO: CNS is intact  PSYCH: Normal  EXTREM: Mild arthritic changes both knees  Skin: Clear     Results Review:    No results found for this or any previous visit (from the past 672 hour(s)).  Procedures    Medication Review: Medications reviewed and noted    Patient wellness counseling  Exercise: Encouraged home and rest  Diet: Encouraged healthy cardiac diet with reduced carbs  Smoking: Non-smoker  Alcohol: None alcohol  Screening: Go ahead and obtain the fasting lab that the patient had originally planned to come to the office for this day    Assessment/Plan:    Problem List Items Addressed This Visit        Cardiovascular and Mediastinum    Mixed hyperlipidemia    Current Assessment & Plan     History of mixed hyperlipidemia on atorvastatin 10 mg daily denies myalgia arthralgia continue current therapy         Relevant Medications    atorvastatin (LIPITOR) 10 MG tablet    Benign essential hypertension - Primary    Current Assessment & Plan     History of essential hypertension on lisinopril 5 mg daily with blood pressure 128/90 repeated at 128/84 left and right sitting the patient is having a migraine headache at this time I think which speaks for his elevated diastolic.         Relevant Medications    lisinopril (PRINIVIL,ZESTRIL) 5 MG tablet    Periodic headache syndrome, not intractable    Overview     Infrequent headaches this particular headache is present for the past 18 hours with posterior headache, some minor visual changes, mild nausea, generally not feeling well, at approximately 1:00 this morning the patient Advil migraine with some minor  relief.         Current Assessment & Plan     If he begins a posterior headache late afternoon of September 18 overnight with discomfort to Advil migraine tablets at approximately 1 AM of minor benefit currently pupils are equal reactive no nystagmus no facial asymmetry nonspecific scalp tenderness no rash consistent with migraine headache we will treat with Imitrex prescription sent.           Relevant Medications    SUMAtriptan (IMITREX) 100 MG tablet       Digestive    Hiatal hernia with GERD    Current Assessment & Plan     Showed gastritis and GERD on omeprazole 40 mg daily denies myalgia         Relevant Medications    omeprazole (priLOSEC) 40 MG capsule           Patient Instructions   Prescription written for Imitrex 100 mg 1 tablet as needed #9  Encouraged to go ahead and get the fasting lab work that was originally proposed for this day  Return regular exam on December 12 or as needed  Notify us if the headache persists or changes.       Plan of care reviewed with patient at the conclusion of today's visit. Education was provided regarding diagnosis, management, and any prescribed or recommended OTC medications.Patient verbalizes understanding of and agreement with management plan.         Shahram Napier MD      Note: Part of this note may be an electronic transcription/translation of spoken language to printed text using the Dragon Dictation system.

## 2019-09-19 NOTE — ASSESSMENT & PLAN NOTE
History of mixed hyperlipidemia on atorvastatin 10 mg daily denies myalgia arthralgia continue current therapy

## 2019-09-19 NOTE — ASSESSMENT & PLAN NOTE
If he begins a posterior headache late afternoon of September 18 overnight with discomfort to Advil migraine tablets at approximately 1 AM of minor benefit currently pupils are equal reactive no nystagmus no facial asymmetry nonspecific scalp tenderness no rash consistent with migraine headache we will treat with Imitrex prescription sent.

## 2019-09-20 LAB — ALBUMIN UR-MCNC: <1.2 MG/DL

## 2019-11-14 RX ORDER — ATORVASTATIN CALCIUM 10 MG/1
TABLET, FILM COATED ORAL
Qty: 30 TABLET | Refills: 5 | Status: SHIPPED | OUTPATIENT
Start: 2019-11-14 | End: 2020-09-20 | Stop reason: SDUPTHER

## 2020-04-09 RX ORDER — LISINOPRIL 5 MG/1
TABLET ORAL
Qty: 90 TABLET | Refills: 0 | Status: SHIPPED | OUTPATIENT
Start: 2020-04-09 | End: 2020-07-22

## 2020-07-22 RX ORDER — LISINOPRIL 5 MG/1
TABLET ORAL
Qty: 30 TABLET | Refills: 0 | Status: SHIPPED | OUTPATIENT
Start: 2020-07-22 | End: 2020-08-28

## 2020-08-04 RX ORDER — OMEPRAZOLE 40 MG/1
CAPSULE, DELAYED RELEASE ORAL
Qty: 90 CAPSULE | Refills: 0 | Status: SHIPPED | OUTPATIENT
Start: 2020-08-04 | End: 2020-09-09

## 2020-08-28 RX ORDER — LISINOPRIL 5 MG/1
TABLET ORAL
Qty: 30 TABLET | Refills: 0 | Status: SHIPPED | OUTPATIENT
Start: 2020-08-28 | End: 2020-10-01

## 2020-09-09 RX ORDER — OMEPRAZOLE 40 MG/1
CAPSULE, DELAYED RELEASE ORAL
Qty: 90 CAPSULE | Refills: 0 | Status: SHIPPED | OUTPATIENT
Start: 2020-09-09 | End: 2021-03-15

## 2020-09-18 ENCOUNTER — OFFICE VISIT (OUTPATIENT)
Dept: INTERNAL MEDICINE | Facility: CLINIC | Age: 50
End: 2020-09-18

## 2020-09-18 ENCOUNTER — LAB (OUTPATIENT)
Dept: LAB | Facility: HOSPITAL | Age: 50
End: 2020-09-18

## 2020-09-18 ENCOUNTER — TELEPHONE (OUTPATIENT)
Dept: INTERNAL MEDICINE | Facility: CLINIC | Age: 50
End: 2020-09-18

## 2020-09-18 VITALS
WEIGHT: 225.2 LBS | BODY MASS INDEX: 29.85 KG/M2 | HEART RATE: 64 BPM | DIASTOLIC BLOOD PRESSURE: 80 MMHG | TEMPERATURE: 96.9 F | SYSTOLIC BLOOD PRESSURE: 116 MMHG | HEIGHT: 73 IN

## 2020-09-18 DIAGNOSIS — E78.2 MIXED HYPERLIPIDEMIA: ICD-10-CM

## 2020-09-18 DIAGNOSIS — I10 BENIGN ESSENTIAL HYPERTENSION: ICD-10-CM

## 2020-09-18 DIAGNOSIS — K44.9 HIATAL HERNIA WITH GERD: ICD-10-CM

## 2020-09-18 DIAGNOSIS — R05.3 CHRONIC COUGH: Primary | ICD-10-CM

## 2020-09-18 DIAGNOSIS — Z87.891 FORMER SMOKER: ICD-10-CM

## 2020-09-18 DIAGNOSIS — K21.9 HIATAL HERNIA WITH GERD: ICD-10-CM

## 2020-09-18 LAB
ALBUMIN SERPL-MCNC: 4.8 G/DL (ref 3.5–5.2)
ALBUMIN/GLOB SERPL: 1.9 G/DL
ALP SERPL-CCNC: 73 U/L (ref 39–117)
ALT SERPL W P-5'-P-CCNC: 61 U/L (ref 1–41)
ANION GAP SERPL CALCULATED.3IONS-SCNC: 9.4 MMOL/L (ref 5–15)
AST SERPL-CCNC: 47 U/L (ref 1–40)
BASOPHILS # BLD AUTO: 0.03 10*3/MM3 (ref 0–0.2)
BASOPHILS NFR BLD AUTO: 0.6 % (ref 0–1.5)
BILIRUB SERPL-MCNC: 1 MG/DL (ref 0–1.2)
BUN SERPL-MCNC: 13 MG/DL (ref 6–20)
BUN/CREAT SERPL: 13.4 (ref 7–25)
CALCIUM SPEC-SCNC: 9.5 MG/DL (ref 8.6–10.5)
CHLORIDE SERPL-SCNC: 101 MMOL/L (ref 98–107)
CHOLEST SERPL-MCNC: 213 MG/DL (ref 0–200)
CO2 SERPL-SCNC: 25.6 MMOL/L (ref 22–29)
CREAT SERPL-MCNC: 0.97 MG/DL (ref 0.76–1.27)
DEPRECATED RDW RBC AUTO: 41.1 FL (ref 37–54)
EOSINOPHIL # BLD AUTO: 0.08 10*3/MM3 (ref 0–0.4)
EOSINOPHIL NFR BLD AUTO: 1.6 % (ref 0.3–6.2)
ERYTHROCYTE [DISTWIDTH] IN BLOOD BY AUTOMATED COUNT: 12 % (ref 12.3–15.4)
GFR SERPL CREATININE-BSD FRML MDRD: 81 ML/MIN/1.73
GLOBULIN UR ELPH-MCNC: 2.5 GM/DL
GLUCOSE SERPL-MCNC: 94 MG/DL (ref 65–99)
HCT VFR BLD AUTO: 44.1 % (ref 37.5–51)
HDLC SERPL-MCNC: 51 MG/DL (ref 40–60)
HGB BLD-MCNC: 15.7 G/DL (ref 13–17.7)
IMM GRANULOCYTES # BLD AUTO: 0.02 10*3/MM3 (ref 0–0.05)
IMM GRANULOCYTES NFR BLD AUTO: 0.4 % (ref 0–0.5)
LDLC SERPL CALC-MCNC: 138 MG/DL (ref 0–100)
LDLC/HDLC SERPL: 2.71 {RATIO}
LYMPHOCYTES # BLD AUTO: 1.52 10*3/MM3 (ref 0.7–3.1)
LYMPHOCYTES NFR BLD AUTO: 31.3 % (ref 19.6–45.3)
MCH RBC QN AUTO: 33.9 PG (ref 26.6–33)
MCHC RBC AUTO-ENTMCNC: 35.6 G/DL (ref 31.5–35.7)
MCV RBC AUTO: 95.2 FL (ref 79–97)
MONOCYTES # BLD AUTO: 0.46 10*3/MM3 (ref 0.1–0.9)
MONOCYTES NFR BLD AUTO: 9.5 % (ref 5–12)
NEUTROPHILS NFR BLD AUTO: 2.75 10*3/MM3 (ref 1.7–7)
NEUTROPHILS NFR BLD AUTO: 56.6 % (ref 42.7–76)
NRBC BLD AUTO-RTO: 0.2 /100 WBC (ref 0–0.2)
PLATELET # BLD AUTO: 221 10*3/MM3 (ref 140–450)
PMV BLD AUTO: 10.4 FL (ref 6–12)
POTASSIUM SERPL-SCNC: 4.6 MMOL/L (ref 3.5–5.2)
PROT SERPL-MCNC: 7.3 G/DL (ref 6–8.5)
RBC # BLD AUTO: 4.63 10*6/MM3 (ref 4.14–5.8)
SODIUM SERPL-SCNC: 136 MMOL/L (ref 136–145)
TRIGL SERPL-MCNC: 120 MG/DL (ref 0–150)
TSH SERPL DL<=0.05 MIU/L-ACNC: 2.24 UIU/ML (ref 0.27–4.2)
VLDLC SERPL-MCNC: 24 MG/DL (ref 5–40)
WBC # BLD AUTO: 4.86 10*3/MM3 (ref 3.4–10.8)

## 2020-09-18 PROCEDURE — 99214 OFFICE O/P EST MOD 30 MIN: CPT | Performed by: NURSE PRACTITIONER

## 2020-09-18 PROCEDURE — 84443 ASSAY THYROID STIM HORMONE: CPT

## 2020-09-18 PROCEDURE — 80061 LIPID PANEL: CPT

## 2020-09-18 PROCEDURE — 80053 COMPREHEN METABOLIC PANEL: CPT

## 2020-09-18 PROCEDURE — 85025 COMPLETE CBC W/AUTO DIFF WBC: CPT

## 2020-09-18 NOTE — PROGRESS NOTES
Eitan Royal  1970  [id number]  Patient Care Team:  Shahram Napier MD as PCP - General (Internal Medicine)    Eitan Royal is a pleasant 50 y.o. male who presents for evaluation of Cough (dry )    Chief Complaint   Patient presents with   • Cough     dry        HPI:   Dry cough intermittent x 1 yr.  Denies sob.  Not weather related.  More coughing in the evening.   No fevers, night sweats. Last cxr .  No lung ct  Former smoker.  Smoked 1/2 ppd 10 yrs, quit about 10 yrs ago.    Was also a  at [employer]    GERD:  On omeprazole with good mgmt of sx    Would also like to get labs, he is fasting  Hyperlipidemia:  Stopped lipitor , was eating better    HTN:  Taking lisinopril 5mg, not checking bp oftern  Past Medical History:   Diagnosis Date   • Appendiceal abscess 2004    POST    • Dumping syndrome    • GERD (gastroesophageal reflux disease)    • Hyperlipidemia    • Hypertension    • Left ACL tear    • Pseudomembranous colitis 2004   • Right ACL tear      Past Surgical History:   Procedure Laterality Date   • APPENDECTOMY  2004   • CHOLECYSTECTOMY     • COLON SURGERY  2004    resection   • COLONOSCOPY     • EXPLORATORY LAPAROTOMY     • KNEE ACL RECONSTRUCTION Right    • KNEE ARTHROSCOPY Left    • KNEE CARTILAGE SURGERY      ACL and MCL     Family History   Problem Relation Age of Onset   • Osteoarthritis Father    • Heart disease Maternal Grandfather      Social History     Tobacco Use   Smoking Status Former Smoker   • Packs/day: 0.50   • Years: 10.00   • Pack years: 5.00   • Types: Cigarettes   • Quit date:    • Years since quittin.7   Smokeless Tobacco Former User   • Quit date: 2019     Allergies   Allergen Reactions   • Belladonna Alkaloids Unknown (See Comments)     Donnatal   • Phenobarbital Unknown (See Comments)     Donnatal       Current Outpatient Medications:   •  atorvastatin (LIPITOR) 10 MG tablet, TAKE 1 TABLET BY MOUTH ONCE DAILY, Disp: 30  "tablet, Rfl: 5  •  lisinopril (PRINIVIL,ZESTRIL) 5 MG tablet, TAKE 1 TABLET BY MOUTH ONCE DAILY . APPOINTMENT REQUIRED FOR FUTURE REFILLS, Disp: 30 tablet, Rfl: 0  •  omeprazole (priLOSEC) 40 MG capsule, TAKE 1 CAPSULE BY MOUTH ONCE DAILY BEFORE A MEAL, Disp: 90 capsule, Rfl: 0  •  SUMAtriptan (IMITREX) 100 MG tablet, Take one tablet at onset of headache. May repeat dose one time in 2 hours if headache not relieved., Disp: 9 tablet, Rfl: 1    Review of Systems   Constitutional: Negative for chills, fatigue and fever.   HENT: Negative for congestion, ear pain and sinus pressure.    Respiratory: Positive for cough. Negative for chest tightness, shortness of breath and wheezing.    Cardiovascular: Negative for chest pain and palpitations.   Gastrointestinal: Negative for abdominal pain, blood in stool and constipation.   Skin: Negative for color change.   Allergic/Immunologic: Positive for environmental allergies.   Neurological: Negative for dizziness, speech difficulty and headache.   Psychiatric/Behavioral: Negative for decreased concentration. The patient is not nervous/anxious.      /80   Pulse 64   Temp 96.9 °F (36.1 °C) (Temporal)   Ht 185.4 cm (73\")   Wt 102 kg (225 lb 3.2 oz)   BMI 29.71 kg/m²     Physical Exam  Constitutional:       Appearance: Normal appearance.   HENT:      Head: Normocephalic.      Comments: *wearing mask  Cardiovascular:      Rate and Rhythm: Normal rate and regular rhythm.   Pulmonary:      Effort: Pulmonary effort is normal.      Breath sounds: Normal breath sounds.   Neurological:      Mental Status: He is alert and oriented to person, place, and time.   Psychiatric:         Mood and Affect: Mood normal.         Behavior: Behavior normal.         Thought Content: Thought content normal.         Judgment: Judgment normal.         Results Review:  None    Assessment/Plan:  Eitan was seen today for cough.    Diagnoses and all orders for this visit:    Chronic " cough  Comments:  CT ordered    Former smoker  -     CT Chest Low Dose Wo; Future    Benign essential hypertension  Comments:  continue meds  Orders:  -     CBC & Differential; Future  -     Comprehensive Metabolic Panel; Future  -     Microalbumin / Creatinine Urine Ratio - Urine, Clean Catch; Future    Mixed hyperlipidemia  Comments:  on statin, fasting labs today  Orders:  -     Lipid Panel; Future  -     TSH Rfx On Abnormal To Free T4; Future    Hiatal hernia with GERD  Comments:  continue daily PPI       There are no Patient Instructions on file for this visit.  Plan of care reviewed with patient at the conclusion of today's visit. Education was provided regarding diagnosis, management and any prescribed or recommended OTC medications.  Patient verbalizes understanding of and agreement with management plan.    Return for Labs this visit, Annual physical with Dr. Napier.    *Note that portions of this note were completed with a voice recognition program.  Efforts were made to edit the dictation but occasionally words are transcribed.    RUDDY Staples

## 2020-09-18 NOTE — TELEPHONE ENCOUNTER
Patient states that he saw Harriett this morning for a cough from smoking, but he started dipping and is worried about throat cancer.  He wanted to see if he should get an US of his throat.  He can be reached at [phone]

## 2020-09-20 RX ORDER — ATORVASTATIN CALCIUM 10 MG/1
10 TABLET, FILM COATED ORAL DAILY
Qty: 90 TABLET | Refills: 0 | Status: SHIPPED | OUTPATIENT
Start: 2020-09-20 | End: 2022-06-07

## 2020-09-21 DIAGNOSIS — R05.3 CHRONIC COUGH: ICD-10-CM

## 2020-09-21 DIAGNOSIS — Z87.891 FORMER SMOKER: Primary | ICD-10-CM

## 2020-09-21 DIAGNOSIS — Z72.0 SMOKELESS TOBACCO USE: ICD-10-CM

## 2020-09-21 NOTE — TELEPHONE ENCOUNTER
Called patient advised per note he said didn't know of any ENT specialists and will look them up and call us back with his preference

## 2020-09-27 ENCOUNTER — TELEPHONE (OUTPATIENT)
Dept: INTERNAL MEDICINE | Facility: CLINIC | Age: 50
End: 2020-09-27

## 2020-09-27 DIAGNOSIS — R05.3 CHRONIC COUGH: Primary | ICD-10-CM

## 2020-10-01 RX ORDER — LISINOPRIL 5 MG/1
TABLET ORAL
Qty: 30 TABLET | Refills: 0 | Status: SHIPPED | OUTPATIENT
Start: 2020-10-01 | End: 2021-06-08 | Stop reason: SINTOL

## 2020-10-16 ENCOUNTER — TELEPHONE (OUTPATIENT)
Dept: INTERNAL MEDICINE | Facility: CLINIC | Age: 50
End: 2020-10-16

## 2020-10-16 NOTE — TELEPHONE ENCOUNTER
DENICE STATED THE PATIENT HAD A REFERRAL FOR A CT WITHOUT CONTRAST REFERRAL BUT THEY WERE UNABLE TO CONTACT THE PATIENT TO SCHEDULE. DENICE IS REQUESTING A NEW REFERRAL TO BE SENT THEREFORE SO SHE CAN SCHEDULE IT.    DENICE CALL BACK   [phone]

## 2020-10-17 DIAGNOSIS — Z87.891 FORMER SMOKER: ICD-10-CM

## 2020-10-17 DIAGNOSIS — R05.3 CHRONIC COUGH: Primary | ICD-10-CM

## 2020-10-17 DIAGNOSIS — I10 BENIGN ESSENTIAL HYPERTENSION: ICD-10-CM

## 2020-10-17 NOTE — TELEPHONE ENCOUNTER
Message is noted the CT scan was originally ordered as a screening for smoking, however was changed to diagnostic, but the patient will need a regular chest x-ray because his last chest x-ray was 2018 and his insurance will not cover a CT scan without an x-ray so ordered a chest x-ray please inform the patient and spouse

## 2020-12-22 ENCOUNTER — TELEPHONE (OUTPATIENT)
Dept: INTERNAL MEDICINE | Facility: CLINIC | Age: 50
End: 2020-12-22

## 2020-12-22 ENCOUNTER — OFFICE VISIT (OUTPATIENT)
Dept: INTERNAL MEDICINE | Facility: CLINIC | Age: 50
End: 2020-12-22

## 2020-12-22 VITALS
WEIGHT: 231.8 LBS | DIASTOLIC BLOOD PRESSURE: 78 MMHG | SYSTOLIC BLOOD PRESSURE: 118 MMHG | HEART RATE: 85 BPM | OXYGEN SATURATION: 99 % | BODY MASS INDEX: 30.72 KG/M2 | HEIGHT: 73 IN | TEMPERATURE: 97.1 F

## 2020-12-22 DIAGNOSIS — K44.9 HIATAL HERNIA WITH GERD: ICD-10-CM

## 2020-12-22 DIAGNOSIS — K21.9 HIATAL HERNIA WITH GERD: ICD-10-CM

## 2020-12-22 DIAGNOSIS — K56.609 SMALL BOWEL OBSTRUCTION (HCC): ICD-10-CM

## 2020-12-22 DIAGNOSIS — E78.2 MIXED HYPERLIPIDEMIA: ICD-10-CM

## 2020-12-22 DIAGNOSIS — I10 BENIGN ESSENTIAL HYPERTENSION: ICD-10-CM

## 2020-12-22 DIAGNOSIS — G43.C0 PERIODIC HEADACHE SYNDROME, NOT INTRACTABLE: ICD-10-CM

## 2020-12-22 DIAGNOSIS — Z00.00 PREVENTATIVE HEALTH CARE: Primary | ICD-10-CM

## 2020-12-22 DIAGNOSIS — Z12.5 SPECIAL SCREENING FOR MALIGNANT NEOPLASM OF PROSTATE: ICD-10-CM

## 2020-12-22 PROCEDURE — 99396 PREV VISIT EST AGE 40-64: CPT | Performed by: INTERNAL MEDICINE

## 2020-12-22 PROCEDURE — 93000 ELECTROCARDIOGRAM COMPLETE: CPT | Performed by: INTERNAL MEDICINE

## 2020-12-22 RX ORDER — TELMISARTAN 20 MG/1
TABLET ORAL
COMMUNITY
End: 2021-12-06 | Stop reason: SDUPTHER

## 2020-12-22 NOTE — PROGRESS NOTES
[facility]     Eitan Royal  1970   [id number]      Patient Care Team:  Shahram Napier MD as PCP - General (Internal Medicine)    Chief Complaint::   Chief Complaint   Patient presents with   • Annual Exam     not fasting      Essential hypertension  Mixed hyperlipidemia  GERD      HPI  Patient is a 50-year-old male retired from [employer] now business owner of a concrete company with a history of essential hypertension with recent change from lisinopril to telmisartan because of hyperactive airway cough, telmisartan 20 mg is controlling his blood pressure in addition the patient had run out of his atorvastatin at the last office visit he currently is back on Lipitor 10 mg daily denies myalgia arthralgia has a history of occasional migraine type headache, and GERD syndrome currently stable patient has gained weight he is physically active in the [employer] business denies cough wheeze shortness of breath chest pain pressure nausea vomiting      Patient Active Problem List   Diagnosis   • Small bowel obstruction (CMS/HCC)   • GERD without esophagitis   • Mixed hyperlipidemia   • Abnormal liver function tests   • Benign essential hypertension   • Cough   • Hiatal hernia with GERD   • Hordeolum of right eye   • Hypertrophy of prostate without urinary obstruction   • Irritable bowel syndrome   • Obesity (BMI 30.0-34.9)   • Primary localized osteoarthrosis, lower leg   • Seasonal allergies   • Periodic headache syndrome, not intractable   • Preventative health care        Past Medical History:   Diagnosis Date   • Appendiceal abscess 04/2004    POST    • Dumping syndrome 2009   • GERD (gastroesophageal reflux disease)    • Hyperlipidemia    • Hypertension    • Left ACL tear 1986   • Pseudomembranous colitis 05/2004   • Right ACL tear 1996       Past Surgical History:   Procedure Laterality Date   • APPENDECTOMY  04/2004   • CHOLECYSTECTOMY  2005   • COLON SURGERY  04/2004    resection   • COLONOSCOPY     •  EXPLORATORY LAPAROTOMY     • KNEE ACL RECONSTRUCTION Right    • KNEE ARTHROSCOPY Left    • KNEE CARTILAGE SURGERY      ACL and MCL       Family History   Problem Relation Age of Onset   • Osteoarthritis Father    • Heart disease Maternal Grandfather        Social History     Socioeconomic History   • Marital status:      Spouse name: Not on file   • Number of children: Not on file   • Years of education: Not on file   • Highest education level: Not on file   Tobacco Use   • Smoking status: Former Smoker     Packs/day: 0.50     Years: 10.00     Pack years: 5.00     Types: Cigarettes     Quit date:      Years since quittin.9   • Smokeless tobacco: Former User     Quit date: 2019   Substance and Sexual Activity   • Alcohol use: Yes     Alcohol/week: 4.0 standard drinks     Types: 4 Cans of beer per week   • Drug use: No   • Sexual activity: Defer       Allergies   Allergen Reactions   • Belladonna Alkaloids Unknown (See Comments)     Donnatal   • Phenobarbital Unknown (See Comments)     Donnatal       Immunization History   Administered Date(s) Administered   • Tdap 2015        Health Maintenance Due   Topic Date Due   • ZOSTER VACCINE (1 of 2) 2018   • HEPATITIS C SCREENING  2019   • ANNUAL PHYSICAL  2019   • INFLUENZA VACCINE  2020        Review of Systems   Constitutional: Negative for chills, fatigue and fever.   HENT: Negative for congestion, ear pain and sinus pressure.    Respiratory: Negative for cough, chest tightness, shortness of breath and wheezing.    Cardiovascular: Negative for chest pain and palpitations.   Gastrointestinal: Negative for abdominal pain, blood in stool and constipation.   Skin: Negative for color change.   Allergic/Immunologic: Negative for environmental allergies.   Neurological: Negative for dizziness, speech difficulty and headache.   Psychiatric/Behavioral: Negative for decreased concentration. The patient is not  "nervous/anxious.             Vital Signs  Vitals:    12/22/20 1453 12/22/20 1515   BP: (!) 130/104 118/78   BP Location: Left arm    Patient Position: Sitting    Cuff Size: Adult    Pulse: 85    Temp: 97.1 °F (36.2 °C)    TempSrc: Temporal    SpO2: 99%    Weight: 105 kg (231 lb 12.8 oz)    Height: 185.4 cm (72.99\")    PainSc: 0-No pain      Body mass index is 30.59 kg/m².  Patient's Body mass index is 30.59 kg/m². BMI is above normal parameters. Recommendations include: nutrition counseling.     Advance Care Planning   ACP discussion was held with the patient during this visit. Patient has an advance directive in EMR which is still valid.        Current Outpatient Medications:   •  atorvastatin (LIPITOR) 10 MG tablet, Take 1 tablet by mouth Daily., Disp: 90 tablet, Rfl: 0  •  omeprazole (priLOSEC) 40 MG capsule, TAKE 1 CAPSULE BY MOUTH ONCE DAILY BEFORE A MEAL, Disp: 90 capsule, Rfl: 0  •  SUMAtriptan (IMITREX) 100 MG tablet, Take one tablet at onset of headache. May repeat dose one time in 2 hours if headache not relieved., Disp: 9 tablet, Rfl: 1  •  telmisartan (MICARDIS) 20 MG tablet, telmisartan 20 mg tablet  Take 1 tablet every day by oral route for 30 days., Disp: , Rfl:   •  lisinopril (PRINIVIL,ZESTRIL) 5 MG tablet, TAKE 1 TABLET BY MOUTH ONCE DAILY . APPOINTMENT REQUIRED FOR FUTURE REFILLS, Disp: 30 tablet, Rfl: 0    Physical Exam   HEENT: ENT clear no facial asymmetry pharynx is clear  NECK: No mass bruit thyromegaly or neck vein distention  CHEST: Clear without rales wheezes or rhonchi  CARDIAC: Regular rhythm without gallop or rub but initial blood pressure improved to 125/80 left and right sitting  ABD: Liver spleen normal positive bowel sounds no bruit  : Deferred  NEURO: Intact  PSYCH: Normal  EXTREM: Normal  Skin: Clear     Results Review:    Results of lab pending EKG discussed    ECG 12 Lead    Date/Time: 12/22/2020 7:56 PM  Performed by: Shahram Napier MD  Authorized by: Shahram Napier MD "   Comparison: compared with previous ECG from 10/19/2018  Similar to previous ECG  Rhythm: sinus rhythm  Rate: normal  Conduction: conduction normal  QRS axis: normal    Clinical impression: non-specific ECG  Comments: EKG shows sinus rhythm with nonspecific T wave changes no ischemia and similar to EKG of October 19, 2018 asymptomatic        Patient Wellness Counseling:   Plan of care reviewed with patient at the conclusion of today's visit. Education was provided in regards to diagnosis, diet and exercise, prostate cancer screening discussed including benefit of early detection, potential need for follow-up, and harms associated with additional management. Patient agrees to screening.    Nutrition, physical activity, healthy weight,ways to reduce stress, adequate sleep, injury prevention, misuse of tobacco, alcohol and drugs, sexual behavior and STD's, dental health, mental health, and immunizations.    Plan of care reviewed with patient at the conclusion of today's visit. Education was provided regarding diagnosis, management and any prescribed or recommended OTC medications.  Patient verbalizes understanding of and agreement with management plan.        Medication Review: Medications reviewed and noted    Patient wellness counseling  Exercise: Encouraged continued exercise  Diet: Healthy cardiac diet reduce carbs smaller portions of weight loss  Smoking: Non-smoker  Alcohol: Occasional beer  Screening: Return to clinic for fasting lab    Assessment/Plan:  Problem List Items Addressed This Visit        Cardiovascular and Mediastinum    Mixed hyperlipidemia    Overview     History of mixed hyperlipidemia on atorvastatin 10 mg daily denies myalgia arthralgia continue therapy return for fasting CMP and lipid         Current Assessment & Plan       On Lipitor 10 mg return for fasting CMP and lipid continue therapy         Relevant Medications    atorvastatin (LIPITOR) 10 MG tablet    Other Relevant Orders     Comprehensive Metabolic Panel    Lipid Panel    TSH    Benign essential hypertension    Overview     History of essential hypertension previously on lisinopril 5 mg for blood pressure control however developed hyperactive airway cough lisinopril was discontinued and the patient's been placed on telmisartan 20 mg daily cough has resolved his current blood pressure is 118/78 left and right sitting continue current therapy return to clinic for fasting CMP EKG sinus rhythm with no ischemic change similar to October 19, 2018         Current Assessment & Plan       Blood pressure controlled at 118/78 on telmisartan 20 mg daily denies headache or cough as he did with lisinopril return for fasting CMP and lipid EKG is stable continue therapy         Relevant Medications    lisinopril (PRINIVIL,ZESTRIL) 5 MG tablet    telmisartan (MICARDIS) 20 MG tablet    Other Relevant Orders    Comprehensive Metabolic Panel    Lipid Panel    ECG 12 Lead    Periodic headache syndrome, not intractable    Overview     Infrequent headaches this particular headache is present for the past 18 hours with posterior headache, some minor visual changes, mild nausea, generally not feeling well, at approximately 1:00 this morning the patient Advil migraine with some minor relief.         Current Assessment & Plan       Infrequent migraine type headaches Imitrex 100 mg is beneficial           Relevant Medications    SUMAtriptan (IMITREX) 100 MG tablet       Respiratory    Hiatal hernia with GERD    Overview     History of GERD gastritis stable on omeprazole 40 mg daily continue therapy         Current Assessment & Plan     GERD gastritis improved and stable on omeprazole 40 mg daily continue therapy         Relevant Medications    omeprazole (priLOSEC) 40 MG capsule       Digestive    Small bowel obstruction (CMS/HCC)    Overview     Appendix abscess. Surgery in 2004.         Current Assessment & Plan     Appendix abscess requiring extensive surgery  2004 with no sequela currently stable            Other    Preventative health care - Primary    Overview     80-year-old male presents for annual preventative visit and physical exam           Other Visit Diagnoses     Special screening for malignant neoplasm of prostate        Relevant Orders    PSA Screen           Patient Instructions   Continue current therapy  EKG discussed  Return to clinic for fasting lab  Encouraged continued exercise activity  Encouraged healthy cardiac diet reduce carbs smaller portions and weight loss  Return visit in 6 months or as needed       CMP:  Lab Results   Component Value Date    BUN 13 09/18/2020    CREATININE 0.97 09/18/2020    EGFRIFNONA 81 09/18/2020    BCR 13.4 09/18/2020     09/18/2020    K 4.6 09/18/2020    CO2 25.6 09/18/2020    CALCIUM 9.5 09/18/2020    ALBUMIN 4.80 09/18/2020    BILITOT 1.0 09/18/2020    ALKPHOS 73 09/18/2020    AST 47 (H) 09/18/2020    ALT 61 (H) 09/18/2020     HbA1c:  No results found for: HGBA1C  Microalbumin:  Lab Results   Component Value Date    MICROALBUR <1.2 09/19/2019     Lipid Panel  Lab Results   Component Value Date    CHOL 213 (H) 09/18/2020    TRIG 120 09/18/2020    HDL 51 09/18/2020     (H) 09/18/2020    AST 47 (H) 09/18/2020    ALT 61 (H) 09/18/2020        Counseling was given to patient for the following topics: disease prevention.    Plan of care reviewed with patient at the conclusion of today's visit. Education was provided regarding diagnosis, management, and any prescribed or recommended OTC medications.Patient verbalizes understanding of and agreement with management plan.         Shahram Napier MD    Note: Part of this note may be an electronic transcription/translation of spoken language to printed text using Dragon Dictation System.

## 2020-12-23 NOTE — ASSESSMENT & PLAN NOTE
Blood pressure controlled at 118/78 on telmisartan 20 mg daily denies headache or cough as he did with lisinopril return for fasting CMP and lipid EKG is stable continue therapy

## 2020-12-23 NOTE — PATIENT INSTRUCTIONS
Continue current therapy  EKG discussed  Return to clinic for fasting lab  Encouraged continued exercise activity  Encouraged healthy cardiac diet reduce carbs smaller portions and weight loss  Return visit in 6 months or as needed

## 2021-02-18 ENCOUNTER — TELEPHONE (OUTPATIENT)
Dept: INTERNAL MEDICINE | Facility: CLINIC | Age: 51
End: 2021-02-18

## 2021-02-18 NOTE — TELEPHONE ENCOUNTER
You ordered a chest xray on 10/17/20 and pt never had it done. He has been seen since. Can we cancel the order?

## 2021-02-22 ENCOUNTER — TELEPHONE (OUTPATIENT)
Dept: INTERNAL MEDICINE | Facility: CLINIC | Age: 51
End: 2021-02-22

## 2021-02-22 DIAGNOSIS — E55.9 VITAMIN D DEFICIENCY: ICD-10-CM

## 2021-02-22 DIAGNOSIS — I10 BENIGN ESSENTIAL HYPERTENSION: Primary | ICD-10-CM

## 2021-02-22 DIAGNOSIS — E78.2 MIXED HYPERLIPIDEMIA: ICD-10-CM

## 2021-02-22 DIAGNOSIS — Z00.00 PREVENTATIVE HEALTH CARE: ICD-10-CM

## 2021-02-22 NOTE — TELEPHONE ENCOUNTER
Caller: Eitan Royal    Relationship: Self    Best call back number: [phone]    What orders are you requesting (i.e. lab or imaging): LABS: COVID ANTIBODY AND VITAMIN D CHECK     In what timeframe would the patient need to come in: ASAP    Where will you receive your lab/imaging services: IN OFFICE    Additional notes:

## 2021-02-24 ENCOUNTER — LAB (OUTPATIENT)
Dept: LAB | Facility: HOSPITAL | Age: 51
End: 2021-02-24

## 2021-02-24 DIAGNOSIS — I10 BENIGN ESSENTIAL HYPERTENSION: ICD-10-CM

## 2021-02-24 DIAGNOSIS — E78.2 MIXED HYPERLIPIDEMIA: ICD-10-CM

## 2021-02-24 DIAGNOSIS — Z12.5 SPECIAL SCREENING FOR MALIGNANT NEOPLASM OF PROSTATE: ICD-10-CM

## 2021-02-24 DIAGNOSIS — Z00.00 PREVENTATIVE HEALTH CARE: ICD-10-CM

## 2021-02-24 DIAGNOSIS — E55.9 VITAMIN D DEFICIENCY: ICD-10-CM

## 2021-02-24 LAB
25(OH)D3 SERPL-MCNC: 38.3 NG/ML (ref 30–100)
ALBUMIN SERPL-MCNC: 4.6 G/DL (ref 3.5–5.2)
ALBUMIN/GLOB SERPL: 2.2 G/DL
ALP SERPL-CCNC: 80 U/L (ref 39–117)
ALT SERPL W P-5'-P-CCNC: 71 U/L (ref 1–41)
ANION GAP SERPL CALCULATED.3IONS-SCNC: 11.3 MMOL/L (ref 5–15)
AST SERPL-CCNC: 51 U/L (ref 1–40)
BILIRUB SERPL-MCNC: 1.1 MG/DL (ref 0–1.2)
BUN SERPL-MCNC: 10 MG/DL (ref 6–20)
BUN/CREAT SERPL: 9.9 (ref 7–25)
CALCIUM SPEC-SCNC: 9.4 MG/DL (ref 8.6–10.5)
CHLORIDE SERPL-SCNC: 99 MMOL/L (ref 98–107)
CHOLEST SERPL-MCNC: 165 MG/DL (ref 0–200)
CO2 SERPL-SCNC: 25.7 MMOL/L (ref 22–29)
CREAT SERPL-MCNC: 1.01 MG/DL (ref 0.76–1.27)
GFR SERPL CREATININE-BSD FRML MDRD: 78 ML/MIN/1.73
GLOBULIN UR ELPH-MCNC: 2.1 GM/DL
GLUCOSE SERPL-MCNC: 93 MG/DL (ref 65–99)
HDLC SERPL-MCNC: 54 MG/DL (ref 40–60)
LDLC SERPL CALC-MCNC: 85 MG/DL (ref 0–100)
LDLC/HDLC SERPL: 1.5 {RATIO}
POTASSIUM SERPL-SCNC: 4.4 MMOL/L (ref 3.5–5.2)
PROT SERPL-MCNC: 6.7 G/DL (ref 6–8.5)
PSA SERPL-MCNC: 0.69 NG/ML (ref 0–4)
SODIUM SERPL-SCNC: 136 MMOL/L (ref 136–145)
TRIGL SERPL-MCNC: 149 MG/DL (ref 0–150)
TSH SERPL DL<=0.05 MIU/L-ACNC: 2.46 UIU/ML (ref 0.27–4.2)
VLDLC SERPL-MCNC: 26 MG/DL (ref 5–40)

## 2021-02-24 PROCEDURE — 80061 LIPID PANEL: CPT

## 2021-02-24 PROCEDURE — 86769 SARS-COV-2 COVID-19 ANTIBODY: CPT

## 2021-02-24 PROCEDURE — 80053 COMPREHEN METABOLIC PANEL: CPT

## 2021-02-24 PROCEDURE — 82306 VITAMIN D 25 HYDROXY: CPT

## 2021-02-24 PROCEDURE — 84443 ASSAY THYROID STIM HORMONE: CPT

## 2021-02-24 PROCEDURE — G0103 PSA SCREENING: HCPCS

## 2021-02-24 PROCEDURE — 36415 COLL VENOUS BLD VENIPUNCTURE: CPT

## 2021-02-25 LAB — SARS-COV-2 AB SERPL QL IA: NEGATIVE

## 2021-03-15 RX ORDER — OMEPRAZOLE 40 MG/1
CAPSULE, DELAYED RELEASE ORAL
Qty: 90 CAPSULE | Refills: 0 | Status: SHIPPED | OUTPATIENT
Start: 2021-03-15 | End: 2021-07-16

## 2021-04-21 ENCOUNTER — TELEPHONE (OUTPATIENT)
Dept: INTERNAL MEDICINE | Facility: CLINIC | Age: 51
End: 2021-04-21

## 2021-04-21 NOTE — TELEPHONE ENCOUNTER
MR. MEADE WAS CALLED AND HE WANTED DR. MURPHY TO REVIEW HIS DAUGHTER'S CHART MATTIE MEADE WHO IS A PATIENT OF [facility].  SEE TELEPHONE NOTE IN MATTIE MEADE'S CHART TO BE REVIEWED BY DR. MURPHY.

## 2021-04-21 NOTE — TELEPHONE ENCOUNTER
Caller: Eitan Royal    Relationship: Self    Best call back number: [phone]    What is the best time to reach you: ANYTIME     Who are you requesting to speak with (clinical staff, provider,  specific staff member): JATINDER     What was the call regarding: PATIENT HAS QUESTIONS FOR JATINDER.  PATIENT GAVE NO FURTHER INFORMATION.

## 2021-06-08 ENCOUNTER — OFFICE VISIT (OUTPATIENT)
Dept: INTERNAL MEDICINE | Facility: CLINIC | Age: 51
End: 2021-06-08

## 2021-06-08 VITALS
BODY MASS INDEX: 30.27 KG/M2 | HEIGHT: 73 IN | OXYGEN SATURATION: 97 % | HEART RATE: 84 BPM | WEIGHT: 228.4 LBS | SYSTOLIC BLOOD PRESSURE: 120 MMHG | DIASTOLIC BLOOD PRESSURE: 100 MMHG | TEMPERATURE: 97.8 F

## 2021-06-08 DIAGNOSIS — R19.7 DIARRHEA OF PRESUMED INFECTIOUS ORIGIN: Primary | ICD-10-CM

## 2021-06-08 DIAGNOSIS — E78.2 MIXED HYPERLIPIDEMIA: ICD-10-CM

## 2021-06-08 DIAGNOSIS — I10 BENIGN ESSENTIAL HYPERTENSION: ICD-10-CM

## 2021-06-08 PROCEDURE — 99213 OFFICE O/P EST LOW 20 MIN: CPT | Performed by: INTERNAL MEDICINE

## 2021-06-08 NOTE — PATIENT INSTRUCTIONS
Suggest Gatorade and Pedialyte  Suggest no fried foods  Suggest Pepto-Bismol tablespoon 3 times daily  Suggest Imodium 3 times daily  Samples of Xifaxan 550 mg twice daily for 3 days  Let us know if not improved  Return visit June 24 regular schedule appointment

## 2021-06-08 NOTE — ASSESSMENT & PLAN NOTE
3 days of watery diarrhea without fever chills with myalgia arthralgia and generally feeling ill without nausea vomiting.  Rectal exam is negative for blood and no mucus on the rectal exam  Abdominal exam general mild soreness without guarding or rebound  Suggest Gatorade or Pedialyte  Suggest Pepto-Bismol daily spine 3 times a day  Suggest Imodium 1 tablet 3 times a day  Samples of Xifaxan 550 mg twice daily for 3 days  Notify us if not improved

## 2021-06-08 NOTE — ASSESSMENT & PLAN NOTE
Essential hypertension with current blood pressure 120/100 repeated at 120/90 left and right sitting continue telmisartan 20 mg daily

## 2021-06-08 NOTE — PROGRESS NOTES
[facility]     Eitan Royal  1970   [id number]      Patient Care Team:  Shahram Napier MD as PCP - General (Internal Medicine)    Chief Complaint::   Chief Complaint   Patient presents with   • Diarrhea     X's 3 days    • Dizziness            HPI  Patient 51-year-old male complaining of watery diarrhea for the past 3 days developed early Sunday morning June 6.  The patient had no unusual foods on Saturday the fifth with a family member has been sick he denies fever chills does complain of mild arthralgia denies rectal bleeding said multiple bowel movements of liquid for the last 3 days.  The patient is taking no medication for therapy of this.  History of essential hypertension mixed hyperlipidemia all currently stable.      Patient Active Problem List   Diagnosis   • Small bowel obstruction (CMS/HCC)   • GERD without esophagitis   • Mixed hyperlipidemia   • Abnormal liver function tests   • Benign essential hypertension   • Cough   • Hiatal hernia with GERD   • Hordeolum of right eye   • Hypertrophy of prostate without urinary obstruction   • Irritable bowel syndrome   • Obesity (BMI 30.0-34.9)   • Primary localized osteoarthrosis, lower leg   • Seasonal allergies   • Periodic headache syndrome, not intractable   • Preventative health care   • Diarrhea of presumed infectious origin        Past Medical History:   Diagnosis Date   • Appendiceal abscess 04/2004    POST    • Dumping syndrome 2009   • GERD (gastroesophageal reflux disease)    • Hyperlipidemia    • Hypertension    • Left ACL tear 1986   • Pseudomembranous colitis 05/2004   • Right ACL tear 1996       Past Surgical History:   Procedure Laterality Date   • APPENDECTOMY  04/2004   • CHOLECYSTECTOMY  2005   • COLON SURGERY  04/2004    resection   • COLONOSCOPY     • EXPLORATORY LAPAROTOMY     • KNEE ACL RECONSTRUCTION Right 1996   • KNEE ARTHROSCOPY Left 1986   • KNEE CARTILAGE SURGERY      ACL and MCL       Family History   Problem  "Relation Age of Onset   • Osteoarthritis Father    • Heart disease Maternal Grandfather        Social History     Socioeconomic History   • Marital status:      Spouse name: Not on file   • Number of children: Not on file   • Years of education: Not on file   • Highest education level: Not on file   Tobacco Use   • Smoking status: Former Smoker     Packs/day: 0.50     Years: 10.00     Pack years: 5.00     Types: Cigarettes     Quit date:      Years since quittin.4   • Smokeless tobacco: Former User     Quit date: 2019   Vaping Use   • Vaping Use: Never used   Substance and Sexual Activity   • Alcohol use: Not Currently     Alcohol/week: 4.0 standard drinks     Types: 4 Cans of beer per week   • Drug use: No   • Sexual activity: Defer       Allergies   Allergen Reactions   • Belladonna Alkaloids Unknown (See Comments)     Donnatal   • Phenobarbital Unknown (See Comments)     Donnatal       Review of Systems     HEENT: Denies headache slightly lightheaded  NECK: Denies pain or stiffness  CHEST: Denies cough wheeze or shortness of breath  CARDIAC: Denies chest pain pressure palpitations  ABD: Denies nausea vomiting complains of a diarrhea with minimal abdominal discomfort  : Denies dysuria frequency  NEURO: Denies syncope concussion  PSYCH: Denies anxiety depression  EXTREM: Complains of low myalgia arthralgia    Vital Signs  Vitals:    21 1240   BP: 120/100   BP Location: Left arm   Patient Position: Sitting   Cuff Size: Adult   Pulse: 84   Temp: 97.8 °F (36.6 °C)   TempSrc: Temporal   SpO2: 97%   Weight: 104 kg (228 lb 6.4 oz)   Height: 185.4 cm (72.99\")   PainSc: 0-No pain     Body mass index is 30.14 kg/m².  Patient's Body mass index is 30.14 kg/m². indicating that he is obese (BMI >30). Obesity-related health conditions include the following: hypertension and dyslipidemias. Obesity is unchanged. BMI is is above average; BMI management plan is completed. We discussed low calorie, low " carb based diet program, portion control and increasing exercise..     Advance Care Planning         Current Outpatient Medications:   •  atorvastatin (LIPITOR) 10 MG tablet, Take 1 tablet by mouth Daily., Disp: 90 tablet, Rfl: 0  •  omeprazole (priLOSEC) 40 MG capsule, TAKE 1 CAPSULE BY MOUTH ONCE DAILY BEFORE A MEAL, Disp: 90 capsule, Rfl: 0  •  SUMAtriptan (IMITREX) 100 MG tablet, Take one tablet at onset of headache. May repeat dose one time in 2 hours if headache not relieved., Disp: 9 tablet, Rfl: 1  •  telmisartan (MICARDIS) 20 MG tablet, telmisartan 20 mg tablet  Take 1 tablet every day by oral route for 30 days., Disp: , Rfl:     Physical Exam     ACE III MINI         HEENT: No asymmetry pharynx is clear  NECK: No mass bruit thyromegaly   CHEST: Clear  CARDIAC: Regular without gallop or rub  ABD: Bowel sounds present no guarding or rebound mild general soreness rectal exam is negative for blood  : Deferred  NEURO: Intact  PSYCH: Normal  EXTREM: Mild muscle soreness  Skin: Clear     Results Review:    No results found for this or any previous visit (from the past 672 hour(s)).  Procedures    Medication Review: Medications reviewed and noted    Patient wellness counseling  Exercise: Encouraged rest  Diet: Encouraged.  Gatorade or Pedialyte and no fried foods  Smoking: Non-smoker  Alcohol: None alcohol  Screening: Rectal exam negative Hemoccult    Assessment/Plan:    Problem List Items Addressed This Visit        Cardiac and Vasculature    Mixed hyperlipidemia    Overview     History of mixed hyperlipidemia on atorvastatin 10 mg daily denies myalgia arthralgia continue therapy return for fasting CMP and lipid         Current Assessment & Plan       Mixed hyperlipidemia on atorvastatin 10 mg daily continue therapy         Relevant Medications    atorvastatin (LIPITOR) 10 MG tablet    Benign essential hypertension    Overview     History of essential hypertension previously on lisinopril 5 mg for blood  pressure control however developed hyperactive airway cough lisinopril was discontinued and the patient's been placed on telmisartan 20 mg daily cough has resolved his current blood pressure is 118/78 left and right sitting continue current therapy return to clinic for fasting CMP EKG sinus rhythm with no ischemic change similar to October 19, 2018         Current Assessment & Plan       Essential hypertension with current blood pressure 120/100 repeated at 120/90 left and right sitting continue telmisartan 20 mg daily         Relevant Medications    telmisartan (MICARDIS) 20 MG tablet       Gastrointestinal Abdominal     Diarrhea of presumed infectious origin - Primary    Overview     Patient complains of watery diarrhea since Sunday, June 6, denies chills or fever denies abdominal cramping denies rectal bleeding, his dietary intake on June 5 was normal no one else in the family has been ill.  In addition the patient has been complaining of some myalgia arthralgia, has had no nausea no vomiting has been trying to hydrate with Pedialyte and fluids and generally does not feel well.         Current Assessment & Plan     3 days of watery diarrhea without fever chills with myalgia arthralgia and generally feeling ill without nausea vomiting.  Rectal exam is negative for blood and no mucus on the rectal exam  Abdominal exam general mild soreness without guarding or rebound  Suggest Gatorade or Pedialyte  Suggest Pepto-Bismol daily spine 3 times a day  Suggest Imodium 1 tablet 3 times a day  Samples of Xifaxan 550 mg twice daily for 3 days  Notify us if not improved                Patient Instructions   Suggest Gatorade and Pedialyte  Suggest no fried foods  Suggest Pepto-Bismol tablespoon 3 times daily  Suggest Imodium 3 times daily  Samples of Xifaxan 550 mg twice daily for 3 days  Let us know if not improved  Return visit June 24 regular schedule appointment       Plan of care reviewed with patient at the conclusion of  today's visit. Education was provided regarding diagnosis, management, and any prescribed or recommended OTC medications.Patient verbalizes understanding of and agreement with management plan.         Shahram Napier MD      Note: Part of this note may be an electronic transcription/translation of spoken language to printed text using the Dragon Dictation system.

## 2021-07-16 RX ORDER — OMEPRAZOLE 40 MG/1
CAPSULE, DELAYED RELEASE ORAL
Qty: 90 CAPSULE | Refills: 0 | Status: SHIPPED | OUTPATIENT
Start: 2021-07-16 | End: 2021-11-16

## 2021-11-16 RX ORDER — OMEPRAZOLE 40 MG/1
CAPSULE, DELAYED RELEASE ORAL
Qty: 90 CAPSULE | Refills: 0 | Status: SHIPPED | OUTPATIENT
Start: 2021-11-16 | End: 2022-04-15

## 2021-12-06 ENCOUNTER — LAB (OUTPATIENT)
Dept: LAB | Facility: HOSPITAL | Age: 51
End: 2021-12-06

## 2021-12-06 ENCOUNTER — OFFICE VISIT (OUTPATIENT)
Dept: INTERNAL MEDICINE | Facility: CLINIC | Age: 51
End: 2021-12-06

## 2021-12-06 VITALS
HEIGHT: 73 IN | HEART RATE: 64 BPM | SYSTOLIC BLOOD PRESSURE: 110 MMHG | BODY MASS INDEX: 30.35 KG/M2 | WEIGHT: 229 LBS | TEMPERATURE: 97.3 F | DIASTOLIC BLOOD PRESSURE: 84 MMHG

## 2021-12-06 DIAGNOSIS — R94.5 ABNORMAL LIVER FUNCTION: Primary | ICD-10-CM

## 2021-12-06 DIAGNOSIS — R79.89 ABNORMAL LIVER FUNCTION TESTS: ICD-10-CM

## 2021-12-06 DIAGNOSIS — E78.2 MIXED HYPERLIPIDEMIA: ICD-10-CM

## 2021-12-06 DIAGNOSIS — K44.9 HIATAL HERNIA WITH GERD: ICD-10-CM

## 2021-12-06 DIAGNOSIS — K21.9 HIATAL HERNIA WITH GERD: ICD-10-CM

## 2021-12-06 DIAGNOSIS — R94.5 ABNORMAL LIVER FUNCTION: ICD-10-CM

## 2021-12-06 DIAGNOSIS — I10 BENIGN ESSENTIAL HYPERTENSION: ICD-10-CM

## 2021-12-06 DIAGNOSIS — G43.C0 PERIODIC HEADACHE SYNDROME, NOT INTRACTABLE: ICD-10-CM

## 2021-12-06 LAB
ALBUMIN SERPL-MCNC: 4.8 G/DL (ref 3.5–5.2)
ALBUMIN/GLOB SERPL: 1.9 G/DL
ALP SERPL-CCNC: 90 U/L (ref 39–117)
ALT SERPL W P-5'-P-CCNC: 69 U/L (ref 1–41)
ANION GAP SERPL CALCULATED.3IONS-SCNC: 11.6 MMOL/L (ref 5–15)
AST SERPL-CCNC: 47 U/L (ref 1–40)
BASOPHILS # BLD AUTO: 0.04 10*3/MM3 (ref 0–0.2)
BASOPHILS NFR BLD AUTO: 0.7 % (ref 0–1.5)
BILIRUB SERPL-MCNC: 1 MG/DL (ref 0–1.2)
BUN SERPL-MCNC: 12 MG/DL (ref 6–20)
BUN/CREAT SERPL: 12.9 (ref 7–25)
CALCIUM SPEC-SCNC: 9.7 MG/DL (ref 8.6–10.5)
CHLORIDE SERPL-SCNC: 99 MMOL/L (ref 98–107)
CHOLEST SERPL-MCNC: 219 MG/DL (ref 0–200)
CO2 SERPL-SCNC: 25.4 MMOL/L (ref 22–29)
CREAT SERPL-MCNC: 0.93 MG/DL (ref 0.76–1.27)
DEPRECATED RDW RBC AUTO: 43.7 FL (ref 37–54)
EOSINOPHIL # BLD AUTO: 0.03 10*3/MM3 (ref 0–0.4)
EOSINOPHIL NFR BLD AUTO: 0.6 % (ref 0.3–6.2)
ERYTHROCYTE [DISTWIDTH] IN BLOOD BY AUTOMATED COUNT: 12.2 % (ref 12.3–15.4)
GFR SERPL CREATININE-BSD FRML MDRD: 85 ML/MIN/1.73
GGT SERPL-CCNC: 74 U/L (ref 8–61)
GLOBULIN UR ELPH-MCNC: 2.5 GM/DL
GLUCOSE SERPL-MCNC: 94 MG/DL (ref 65–99)
HCT VFR BLD AUTO: 47.1 % (ref 37.5–51)
HDLC SERPL-MCNC: 56 MG/DL (ref 40–60)
HGB BLD-MCNC: 16.8 G/DL (ref 13–17.7)
IMM GRANULOCYTES # BLD AUTO: 0.02 10*3/MM3 (ref 0–0.05)
IMM GRANULOCYTES NFR BLD AUTO: 0.4 % (ref 0–0.5)
LDLC SERPL CALC-MCNC: 131 MG/DL (ref 0–100)
LDLC/HDLC SERPL: 2.26 {RATIO}
LYMPHOCYTES # BLD AUTO: 1.9 10*3/MM3 (ref 0.7–3.1)
LYMPHOCYTES NFR BLD AUTO: 35.4 % (ref 19.6–45.3)
MCH RBC QN AUTO: 34.7 PG (ref 26.6–33)
MCHC RBC AUTO-ENTMCNC: 35.7 G/DL (ref 31.5–35.7)
MCV RBC AUTO: 97.3 FL (ref 79–97)
MONOCYTES # BLD AUTO: 0.38 10*3/MM3 (ref 0.1–0.9)
MONOCYTES NFR BLD AUTO: 7.1 % (ref 5–12)
NEUTROPHILS NFR BLD AUTO: 2.99 10*3/MM3 (ref 1.7–7)
NEUTROPHILS NFR BLD AUTO: 55.8 % (ref 42.7–76)
NRBC BLD AUTO-RTO: 0 /100 WBC (ref 0–0.2)
PLATELET # BLD AUTO: 213 10*3/MM3 (ref 140–450)
PMV BLD AUTO: 10.7 FL (ref 6–12)
POTASSIUM SERPL-SCNC: 4.6 MMOL/L (ref 3.5–5.2)
PROT SERPL-MCNC: 7.3 G/DL (ref 6–8.5)
RBC # BLD AUTO: 4.84 10*6/MM3 (ref 4.14–5.8)
SODIUM SERPL-SCNC: 136 MMOL/L (ref 136–145)
TRIGL SERPL-MCNC: 182 MG/DL (ref 0–150)
VLDLC SERPL-MCNC: 32 MG/DL (ref 5–40)
WBC NRBC COR # BLD: 5.36 10*3/MM3 (ref 3.4–10.8)

## 2021-12-06 PROCEDURE — 86803 HEPATITIS C AB TEST: CPT

## 2021-12-06 PROCEDURE — 36415 COLL VENOUS BLD VENIPUNCTURE: CPT

## 2021-12-06 PROCEDURE — 87340 HEPATITIS B SURFACE AG IA: CPT

## 2021-12-06 PROCEDURE — 82977 ASSAY OF GGT: CPT

## 2021-12-06 PROCEDURE — 80053 COMPREHEN METABOLIC PANEL: CPT

## 2021-12-06 PROCEDURE — 85025 COMPLETE CBC W/AUTO DIFF WBC: CPT

## 2021-12-06 PROCEDURE — 86706 HEP B SURFACE ANTIBODY: CPT

## 2021-12-06 PROCEDURE — 80061 LIPID PANEL: CPT

## 2021-12-06 PROCEDURE — 99214 OFFICE O/P EST MOD 30 MIN: CPT | Performed by: INTERNAL MEDICINE

## 2021-12-06 PROCEDURE — 86704 HEP B CORE ANTIBODY TOTAL: CPT

## 2021-12-06 RX ORDER — TELMISARTAN 20 MG/1
20 TABLET ORAL DAILY
Qty: 90 TABLET | Refills: 3 | Status: SHIPPED | OUTPATIENT
Start: 2021-12-06 | End: 2022-06-07

## 2021-12-06 NOTE — ASSESSMENT & PLAN NOTE
This is stable. He is to continue with his prescribed medication.  Continue telmisartan 20 mg daily denies headache and denies hyperactive airway cough with current blood pressure 110/84 CMP is pending

## 2021-12-06 NOTE — ASSESSMENT & PLAN NOTE
I instructed him to continue off of atorvastatin. He is to get above lab work completed as described above.

## 2021-12-06 NOTE — ASSESSMENT & PLAN NOTE
This is stable. He is to continue with his prescribed medication. I advised him to be cautious with eating rapidly.

## 2021-12-06 NOTE — ASSESSMENT & PLAN NOTE
Infrequent migraine type headache Imitrex 100 mg is beneficial and effective continue as needed therapy

## 2021-12-06 NOTE — ASSESSMENT & PLAN NOTE
We will obtain a repeated liver function lab including a hepatitis panel, including hepatitis C. We will also obtain a chemistry profile, kidney function, liver function, a CBC and a cholesterol panel.

## 2021-12-06 NOTE — ASSESSMENT & PLAN NOTE
Mixed hyperlipidemia, patient has had abnormal liver functions and we have held the atorvastatin, fasting CMP and lipid are pending do not resume atorvastatin pending the repeat laboratory

## 2021-12-06 NOTE — PROGRESS NOTES
"[facility]     Eitan Royal  1970   [id number]      Patient Care Team:  Shahram Napier MD as PCP - General (Internal Medicine)    Chief Complaint::   Chief Complaint   Patient presents with   • Hypertension     follow up   • Med Refill     imitrex            HPI    Mr. Royal presents for an reevaluation of abnormal liver functions. He admits that overall he is doing very well. He denies any dark discoloration of his urine. He admits that he is currently not taking atorvastatin. He recalls a recent incident where he was eating dinner last week and experienced a pain where his food \"got stuck\". He admits he was frightened due to a previous similar accident where he had to be hospitalized. He mentions he walked around for about 30 minutes and drank water to dislodge any food. He reports eventually the feeling went away.       Patient Active Problem List   Diagnosis   • Small bowel obstruction (HCC)   • GERD without esophagitis   • Mixed hyperlipidemia   • Abnormal liver function tests   • Benign essential hypertension   • Cough   • Hiatal hernia with GERD   • Hordeolum of right eye   • Hypertrophy of prostate without urinary obstruction   • Irritable bowel syndrome   • Obesity (BMI 30.0-34.9)   • Primary localized osteoarthrosis, lower leg   • Seasonal allergies   • Periodic headache syndrome, not intractable   • Preventative health care   • Diarrhea of presumed infectious origin        Past Medical History:   Diagnosis Date   • Appendiceal abscess 04/2004    POST    • Dumping syndrome 2009   • GERD (gastroesophageal reflux disease)    • Hyperlipidemia    • Hypertension    • Left ACL tear 1986   • Pseudomembranous colitis 05/2004   • Right ACL tear 1996       Past Surgical History:   Procedure Laterality Date   • APPENDECTOMY  04/2004   • CHOLECYSTECTOMY  2005   • COLON SURGERY  04/2004    resection   • COLONOSCOPY     • EXPLORATORY LAPAROTOMY     • KNEE ACL RECONSTRUCTION Right 1996   • KNEE " "ARTHROSCOPY Left    • KNEE CARTILAGE SURGERY      ACL and MCL       Family History   Problem Relation Age of Onset   • Osteoarthritis Father    • Heart disease Maternal Grandfather        Social History     Socioeconomic History   • Marital status:    Tobacco Use   • Smoking status: Former Smoker     Packs/day: 0.50     Years: 10.00     Pack years: 5.00     Types: Cigarettes     Quit date:      Years since quittin.9   • Smokeless tobacco: Former User     Quit date: 2019   Vaping Use   • Vaping Use: Never used   Substance and Sexual Activity   • Alcohol use: Not Currently     Alcohol/week: 4.0 standard drinks     Types: 4 Cans of beer per week   • Drug use: No   • Sexual activity: Defer       Allergies   Allergen Reactions   • Belladonna Alkaloids Unknown (See Comments)     Donnatal   • Phenobarbital Unknown (See Comments)     Donnatal       Review of Systems     HEENT: Denies any hearing changes, eyesight changes, no headache or dizziness   NECK:  Denies any pain, stiffness, swelling or dysphagia   CHEST: Denies cough or wheeze or recent lung infections   CARDIAC: Denies chest pain, pressure or palpitations   ABD: Denies nausea, vomiting or abdominal pain   : Denies dysuria. Denies any dark colored urine   NEURO:  Denies syncope, concussion, neuropathy  PSYCH: Denies any stress   EXTREM: Denies arthritic changes myalgia or arthralgia   SKIN: Denies any rash    Vital Signs  Vitals:    21 0856   BP: 110/84   BP Location: Left arm   Patient Position: Sitting   Cuff Size: Adult   Pulse: 64   Temp: 97.3 °F (36.3 °C)   Weight: 104 kg (229 lb)   Height: 185.4 cm (73\")   PainSc: 0-No pain     Body mass index is 30.21 kg/m².  Patient's Body mass index is 30.21 kg/m². indicating that he is obese (BMI >30). Obesity-related health conditions include the following: hypertension and dyslipidemias. Obesity is unchanged. BMI is is above average; BMI management plan is completed. We discussed low " calorie, low carb based diet program, portion control and increasing exercise..     Advance Care Planning         Current Outpatient Medications:   •  omeprazole (priLOSEC) 40 MG capsule, TAKE 1 CAPSULE BY MOUTH ONCE DAILY BEFORE A MEAL, Disp: 90 capsule, Rfl: 0  •  SUMAtriptan (IMITREX) 100 MG tablet, Take one tablet at onset of headache. May repeat dose one time in 2 hours if headache not relieved., Disp: 9 tablet, Rfl: 1  •  telmisartan (MICARDIS) 20 MG tablet, Take 1 tablet by mouth Daily., Disp: 90 tablet, Rfl: 3  •  atorvastatin (LIPITOR) 10 MG tablet, Take 1 tablet by mouth Daily., Disp: 90 tablet, Rfl: 0    Physical Exam     ACE III MINI         HEENT: Pupils equal reactive, no jaundice evident, ENT clear, no facial asymmetry, pharynx is clear  NECK:  No masses bruit or thyromegaly  CHEST: Clear without rales wheezes or rhonchi  CARDIAC: Regular rhythm without gallop rub or click, blood pressure heart rate stable  ABD: Liver spleen normal, good bowel sounds, nontender  : Deferred  NEURO: Intact   PSYCH: Normal  EXTREM: No significant arthritic changes, no edema. Good ROM in the knees and hips.   SKIN: Clear     Results Review:    Recent Results (from the past 672 hour(s))   Comprehensive Metabolic Panel    Collection Time: 12/06/21  9:31 AM    Specimen: Blood   Result Value Ref Range    Glucose 94 65 - 99 mg/dL    BUN 12 6 - 20 mg/dL    Creatinine 0.93 0.76 - 1.27 mg/dL    Sodium 136 136 - 145 mmol/L    Potassium 4.6 3.5 - 5.2 mmol/L    Chloride 99 98 - 107 mmol/L    CO2 25.4 22.0 - 29.0 mmol/L    Calcium 9.7 8.6 - 10.5 mg/dL    Total Protein 7.3 6.0 - 8.5 g/dL    Albumin 4.80 3.50 - 5.20 g/dL    ALT (SGPT) 69 (H) 1 - 41 U/L    AST (SGOT) 47 (H) 1 - 40 U/L    Alkaline Phosphatase 90 39 - 117 U/L    Total Bilirubin 1.0 0.0 - 1.2 mg/dL    eGFR Non African Amer 85 >60 mL/min/1.73    Globulin 2.5 gm/dL    A/G Ratio 1.9 g/dL    BUN/Creatinine Ratio 12.9 7.0 - 25.0    Anion Gap 11.6 5.0 - 15.0 mmol/L   Lipid  Panel    Collection Time: 12/06/21  9:31 AM    Specimen: Blood   Result Value Ref Range    Total Cholesterol 219 (H) 0 - 200 mg/dL    Triglycerides 182 (H) 0 - 150 mg/dL    HDL Cholesterol 56 40 - 60 mg/dL    LDL Cholesterol  131 (H) 0 - 100 mg/dL    VLDL Cholesterol 32 5 - 40 mg/dL    LDL/HDL Ratio 2.26    Gamma GT    Collection Time: 12/06/21  9:31 AM    Specimen: Blood   Result Value Ref Range    GGT 74 (H) 8 - 61 U/L   CBC Auto Differential    Collection Time: 12/06/21  9:31 AM    Specimen: Blood   Result Value Ref Range    WBC 5.36 3.40 - 10.80 10*3/mm3    RBC 4.84 4.14 - 5.80 10*6/mm3    Hemoglobin 16.8 13.0 - 17.7 g/dL    Hematocrit 47.1 37.5 - 51.0 %    MCV 97.3 (H) 79.0 - 97.0 fL    MCH 34.7 (H) 26.6 - 33.0 pg    MCHC 35.7 31.5 - 35.7 g/dL    RDW 12.2 (L) 12.3 - 15.4 %    RDW-SD 43.7 37.0 - 54.0 fl    MPV 10.7 6.0 - 12.0 fL    Platelets 213 140 - 450 10*3/mm3    Neutrophil % 55.8 42.7 - 76.0 %    Lymphocyte % 35.4 19.6 - 45.3 %    Monocyte % 7.1 5.0 - 12.0 %    Eosinophil % 0.6 0.3 - 6.2 %    Basophil % 0.7 0.0 - 1.5 %    Immature Grans % 0.4 0.0 - 0.5 %    Neutrophils, Absolute 2.99 1.70 - 7.00 10*3/mm3    Lymphocytes, Absolute 1.90 0.70 - 3.10 10*3/mm3    Monocytes, Absolute 0.38 0.10 - 0.90 10*3/mm3    Eosinophils, Absolute 0.03 0.00 - 0.40 10*3/mm3    Basophils, Absolute 0.04 0.00 - 0.20 10*3/mm3    Immature Grans, Absolute 0.02 0.00 - 0.05 10*3/mm3    nRBC 0.0 0.0 - 0.2 /100 WBC     Procedures    Medication Review: Medications reviewed and noted    Patient wellness counseling  Exercise: Encouraged continued exercise  Diet: Encouraged continued healthy cardiac diet and weight loss  Screening: Lab of CBC CMP hepatitis panel including hep C and liver function tests are ordered  Social History     Socioeconomic History   • Marital status:    Tobacco Use   • Smoking status: Former Smoker     Packs/day: 0.50     Years: 10.00     Pack years: 5.00     Types: Cigarettes     Quit date: 2008     Years  since quittin.9   • Smokeless tobacco: Former User     Quit date: 2019   Vaping Use   • Vaping Use: Never used   Substance and Sexual Activity   • Alcohol use: Not Currently     Alcohol/week: 4.0 standard drinks     Types: 4 Cans of beer per week   • Drug use: No   • Sexual activity: Defer        Assessment/Plan:    Problem List Items Addressed This Visit        Cardiac and Vasculature    Mixed hyperlipidemia    Overview     History of mixed hyperlipidemia on atorvastatin 10 mg daily denies myalgia arthralgia continue therapy return for fasting CMP and lipid         Current Assessment & Plan       Mixed hyperlipidemia, patient has had abnormal liver functions and we have held the atorvastatin, fasting CMP and lipid are pending do not resume atorvastatin pending the repeat laboratory         Relevant Medications    atorvastatin (LIPITOR) 10 MG tablet    Other Relevant Orders    CBC & Differential (Completed)    Comprehensive Metabolic Panel (Completed)    Lipid Panel (Completed)    Gamma GT (Completed)    VIRAL HEPATITIS HBV, HCV    Benign essential hypertension    Overview     History of essential hypertension previously on lisinopril 5 mg for blood pressure control however developed hyperactive airway cough lisinopril was discontinued and the patient's been placed on telmisartan 20 mg daily cough has resolved his current blood pressure is 118/78 left and right sitting continue current therapy return to clinic for fasting CMP EKG sinus rhythm with no ischemic change similar to 2018         Current Assessment & Plan     This is stable. He is to continue with his prescribed medication.  Continue telmisartan 20 mg daily denies headache and denies hyperactive airway cough with current blood pressure 110/84 CMP is pending         Relevant Medications    telmisartan (MICARDIS) 20 MG tablet    Other Relevant Orders    CBC & Differential (Completed)    Comprehensive Metabolic Panel (Completed)    Lipid  Panel (Completed)    Gamma GT (Completed)    VIRAL HEPATITIS HBV, HCV       Gastrointestinal Abdominal     Abnormal liver function tests    Overview     Abnormal liver tests, we have discontinued his atorvastatin and awaiting repeated liver function tests including hepatitis panel and hep C and CMP.         Current Assessment & Plan     We will obtain a repeated liver function lab including a hepatitis panel, including hepatitis C. We will also obtain a chemistry profile, kidney function, liver function, a CBC and a cholesterol panel.          Hiatal hernia with GERD    Overview     History of GERD gastritis stable on omeprazole 40 mg daily continue therapy         Current Assessment & Plan     GERD gastritis controlled with omeprazole 40 mg daily continue therapy         Relevant Medications    omeprazole (priLOSEC) 40 MG capsule    Other Relevant Orders    CBC & Differential (Completed)    Comprehensive Metabolic Panel (Completed)    Lipid Panel (Completed)    Gamma GT (Completed)    VIRAL HEPATITIS HBV, HCV       Neuro    Periodic headache syndrome, not intractable    Overview     Infrequent headaches this particular headache is present for the past 18 hours with posterior headache, some minor visual changes, mild nausea, generally not feeling well, at approximately 1:00 this morning the patient Advil migraine with some minor relief.         Current Assessment & Plan       Infrequent migraine type headache Imitrex 100 mg is beneficial and effective continue as needed therapy           Relevant Medications    SUMAtriptan (IMITREX) 100 MG tablet    Other Relevant Orders    CBC & Differential (Completed)    Comprehensive Metabolic Panel (Completed)    Lipid Panel (Completed)    Gamma GT (Completed)    VIRAL HEPATITIS HBV, HCV      Other Visit Diagnoses     Abnormal liver function    -  Primary    Relevant Orders    CBC & Differential (Completed)    Comprehensive Metabolic Panel (Completed)    Lipid Panel (Completed)     Gamma GT (Completed)    VIRAL HEPATITIS HBV, HCV           Patient Instructions   Fasting labs pending including hepatitis panel  Continue all therapy including omeprazole for the GERD  Do not resume the atorvastatin  Telmisartan is renewed  Continue healthy cardiac and healthy exercise walking  Return visit in 6 months pending the lab       Plan of care reviewed with patient at the conclusion of today's visit. Education was provided regarding diagnosis, management, and any prescribed or recommended OTC medications.Patient verbalizes understanding of and agreement with management plan.         Shahram Napier MD      Note: Part of this note may be an electronic transcription/translation of spoken language to printed text using the Dragon Dictation system.      Transcribed from ambient dictation for Shahram Npaier MD by Mikki Piper.  12/06/21   11:56 EST    Patient verbalized consent to the visit recording.  I have personally performed the services described in this document as transcribed by the above individual, and it is both accurate and complete.  Shahram Napier MD  12/6/2021  17:31 EST

## 2021-12-07 LAB
HBV CORE AB SERPL QL IA: NEGATIVE
HBV SURFACE AB SER QL: NON REACTIVE
HBV SURFACE AG SERPL QL IA: NEGATIVE
HCV AB S/CO SERPL IA: <0.1 S/CO RATIO (ref 0–0.9)
HCV AB SERPL QL IA: NORMAL

## 2022-04-15 RX ORDER — OMEPRAZOLE 40 MG/1
CAPSULE, DELAYED RELEASE ORAL
Qty: 90 CAPSULE | Refills: 1 | Status: SHIPPED | OUTPATIENT
Start: 2022-04-15 | End: 2022-10-25

## 2022-05-27 ENCOUNTER — TELEPHONE (OUTPATIENT)
Dept: INTERNAL MEDICINE | Facility: CLINIC | Age: 52
End: 2022-05-27

## 2022-05-27 NOTE — TELEPHONE ENCOUNTER
PATIENT HAS TESTED POSITIVE FOR COVID.  HAS NOT HAD A VACCINE.  HEADACHE, SORE THROAT, FEVER 99.8, ACHY, FATIGUE.  WHAT TO DO?  WIFE HAS HEARD ABOUT ANTIBODIES AND WOULD LIKE TO KNOW ABOUT THAT.      PLEASE CALL [phone]

## 2022-05-27 NOTE — TELEPHONE ENCOUNTER
I sent in paxlovid as he is at relatively high risk.  Let him know this is the preferred antiviral treatment at present.

## 2022-06-07 ENCOUNTER — OFFICE VISIT (OUTPATIENT)
Dept: INTERNAL MEDICINE | Facility: CLINIC | Age: 52
End: 2022-06-07

## 2022-06-07 ENCOUNTER — LAB (OUTPATIENT)
Dept: LAB | Facility: HOSPITAL | Age: 52
End: 2022-06-07

## 2022-06-07 VITALS
SYSTOLIC BLOOD PRESSURE: 128 MMHG | HEART RATE: 56 BPM | BODY MASS INDEX: 30.09 KG/M2 | WEIGHT: 227.07 LBS | HEIGHT: 73 IN | DIASTOLIC BLOOD PRESSURE: 90 MMHG

## 2022-06-07 DIAGNOSIS — K21.9 HIATAL HERNIA WITH GERD: ICD-10-CM

## 2022-06-07 DIAGNOSIS — U07.1 COVID-19 VIRUS INFECTION: ICD-10-CM

## 2022-06-07 DIAGNOSIS — E78.2 MIXED HYPERLIPIDEMIA: ICD-10-CM

## 2022-06-07 DIAGNOSIS — I10 BENIGN ESSENTIAL HYPERTENSION: ICD-10-CM

## 2022-06-07 DIAGNOSIS — K44.9 HIATAL HERNIA WITH GERD: ICD-10-CM

## 2022-06-07 DIAGNOSIS — Z00.00 PREVENTATIVE HEALTH CARE: ICD-10-CM

## 2022-06-07 DIAGNOSIS — Z00.00 PREVENTATIVE HEALTH CARE: Primary | ICD-10-CM

## 2022-06-07 DIAGNOSIS — Z12.5 SPECIAL SCREENING FOR MALIGNANT NEOPLASM OF PROSTATE: ICD-10-CM

## 2022-06-07 LAB
ALBUMIN SERPL-MCNC: 4.5 G/DL (ref 3.5–5.2)
ALBUMIN/GLOB SERPL: 2 G/DL
ALP SERPL-CCNC: 91 U/L (ref 39–117)
ALT SERPL W P-5'-P-CCNC: 83 U/L (ref 1–41)
ANION GAP SERPL CALCULATED.3IONS-SCNC: 12.9 MMOL/L (ref 5–15)
AST SERPL-CCNC: 64 U/L (ref 1–40)
BASOPHILS # BLD AUTO: 0.01 10*3/MM3 (ref 0–0.2)
BASOPHILS NFR BLD AUTO: 0.2 % (ref 0–1.5)
BILIRUB SERPL-MCNC: 0.8 MG/DL (ref 0–1.2)
BUN SERPL-MCNC: 12 MG/DL (ref 6–20)
BUN/CREAT SERPL: 13.5 (ref 7–25)
CALCIUM SPEC-SCNC: 9.2 MG/DL (ref 8.6–10.5)
CHLORIDE SERPL-SCNC: 104 MMOL/L (ref 98–107)
CHOLEST SERPL-MCNC: 193 MG/DL (ref 0–200)
CO2 SERPL-SCNC: 23.1 MMOL/L (ref 22–29)
CREAT SERPL-MCNC: 0.89 MG/DL (ref 0.76–1.27)
DEPRECATED RDW RBC AUTO: 44 FL (ref 37–54)
EGFRCR SERPLBLD CKD-EPI 2021: 101.8 ML/MIN/1.73
EOSINOPHIL # BLD AUTO: 0.04 10*3/MM3 (ref 0–0.4)
EOSINOPHIL NFR BLD AUTO: 0.9 % (ref 0.3–6.2)
ERYTHROCYTE [DISTWIDTH] IN BLOOD BY AUTOMATED COUNT: 12.3 % (ref 12.3–15.4)
GLOBULIN UR ELPH-MCNC: 2.2 GM/DL
GLUCOSE SERPL-MCNC: 96 MG/DL (ref 65–99)
HCT VFR BLD AUTO: 46.5 % (ref 37.5–51)
HDLC SERPL-MCNC: 44 MG/DL (ref 40–60)
HGB BLD-MCNC: 16 G/DL (ref 13–17.7)
IMM GRANULOCYTES # BLD AUTO: 0.02 10*3/MM3 (ref 0–0.05)
IMM GRANULOCYTES NFR BLD AUTO: 0.4 % (ref 0–0.5)
LDLC SERPL CALC-MCNC: 124 MG/DL (ref 0–100)
LDLC/HDLC SERPL: 2.75 {RATIO}
LYMPHOCYTES # BLD AUTO: 1.6 10*3/MM3 (ref 0.7–3.1)
LYMPHOCYTES NFR BLD AUTO: 35.6 % (ref 19.6–45.3)
MCH RBC QN AUTO: 33.8 PG (ref 26.6–33)
MCHC RBC AUTO-ENTMCNC: 34.4 G/DL (ref 31.5–35.7)
MCV RBC AUTO: 98.1 FL (ref 79–97)
MONOCYTES # BLD AUTO: 0.51 10*3/MM3 (ref 0.1–0.9)
MONOCYTES NFR BLD AUTO: 11.4 % (ref 5–12)
NEUTROPHILS NFR BLD AUTO: 2.31 10*3/MM3 (ref 1.7–7)
NEUTROPHILS NFR BLD AUTO: 51.5 % (ref 42.7–76)
NRBC BLD AUTO-RTO: 0 /100 WBC (ref 0–0.2)
PLATELET # BLD AUTO: 204 10*3/MM3 (ref 140–450)
PMV BLD AUTO: 10.8 FL (ref 6–12)
POTASSIUM SERPL-SCNC: 4.4 MMOL/L (ref 3.5–5.2)
PROT SERPL-MCNC: 6.7 G/DL (ref 6–8.5)
PSA SERPL-MCNC: 0.75 NG/ML (ref 0–4)
RBC # BLD AUTO: 4.74 10*6/MM3 (ref 4.14–5.8)
SODIUM SERPL-SCNC: 140 MMOL/L (ref 136–145)
TRIGL SERPL-MCNC: 139 MG/DL (ref 0–150)
TSH SERPL DL<=0.05 MIU/L-ACNC: 1.73 UIU/ML (ref 0.27–4.2)
VLDLC SERPL-MCNC: 25 MG/DL (ref 5–40)
WBC NRBC COR # BLD: 4.49 10*3/MM3 (ref 3.4–10.8)

## 2022-06-07 PROCEDURE — 99396 PREV VISIT EST AGE 40-64: CPT | Performed by: INTERNAL MEDICINE

## 2022-06-07 PROCEDURE — 93000 ELECTROCARDIOGRAM COMPLETE: CPT | Performed by: INTERNAL MEDICINE

## 2022-06-07 PROCEDURE — G0103 PSA SCREENING: HCPCS

## 2022-06-07 PROCEDURE — 99213 OFFICE O/P EST LOW 20 MIN: CPT | Performed by: INTERNAL MEDICINE

## 2022-06-07 PROCEDURE — 80061 LIPID PANEL: CPT

## 2022-06-07 PROCEDURE — 80050 GENERAL HEALTH PANEL: CPT

## 2022-06-07 NOTE — ASSESSMENT & PLAN NOTE
"Patient's COVID infection of May 27 has minor symptoms of \"brain fog\" with minor fatigue overall doing well no therapy indicated  "

## 2022-06-07 NOTE — PATIENT INSTRUCTIONS
Fasting lipid CBC CMP lipid TSH and PSA are pending  Continue omeprazole for gastritis  Check blood pressure weekly and if changes from his results of 125/80 please inform  Encouraged healthy cardiac diet and weight loss  Continue working exercise  Encouraged Tylenol or Advil as needed for any of the symptoms of COVID  Return in 6 months or as needed  Note patient is no longer on atorvastatin or Micardis

## 2022-06-07 NOTE — ASSESSMENT & PLAN NOTE
The patient's blood pressure is slightly elevated today at 128/88 mmHg. He will continue to monitor his blood pressure at home.  No longer on Micardis of 20 mg as he states his blood pressure was improved and so we discontinued CMP is pending EKG shows sinus bradycardia with a rate of 57 nonspecific T wave changes and similar to EKG of December 22, 2020

## 2022-06-07 NOTE — PROGRESS NOTES
[facility]     Eitan Royal  1970   [id number]      Patient Care Team:  Shahram Napier MD as PCP - General (Internal Medicine)    Chief Complaint::   Chief Complaint   Patient presents with   • Annual Exam     Patient is fasting   • s/p covid     5/27/22            HPI    The patient is a 52-year-old male who presents for a preventative visit and physical examination stating that he had COVID-19 infection on 05/27/2022 with a history of essential hypertension on telmisartan, mixed hyperlipidemia on atorvastatin, chronic GERD on omeprazole, and migraine syndrome headache on Imitrex 100 mg as needed.    The patient reports that he is doing well. He states that he is getting over COVID-19. He states that his symptoms started out with a bad headache, sore body, and back pain. The patient states that he has been in a little of a brain fog recently, and he is still a little more tired than usual. He states that his wife had an at home test, and he took it on 05/27/2022, the day he started feeling bad. He states that he had a dry cough for a few days. The patient states that he feels better. He states that he did quarantine. The patient states that he has not been vaccinated.     The patient states that he has not had a migraine in a long time. He states that he does have the medicine at home if he needs it. He denies any vision changes or hearing loss. The patient states that he takes Allegra-D for his allergy symptoms, and that seems to help in the spring. He denies any trouble with his mouth or throat, or difficulty swallowing. He states that he gets short of breath if he does bends over while doing something. The patient states that he is not sure if it is because he is too fat.     The patient denies any heart racing, skipping, or palpitations. He states that he is no longer on the telmisartan. He states that he stopped taking it 6 to 8 months ago. The patient states that he would check his blood  pressure at home, and it would seem to be fine all the time. He reports it is usually 120/80 mmHg.    The patient states that he stopped taking the cholesterol drug. He states that his liver numbers were high. He states that he started eating a lot of fruit, and thought he would change his diet, but might have fell off, so they probably need to check the cholesterol again. The patient denies any trouble with his stomach, nausea, or vomiting. He states that his kidneys are working okay. He states that his arms and legs are doing pretty well. 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     Patient Active Problem List   Diagnosis   • Small bowel obstruction (HCC)   • GERD without esophagitis   • Mixed hyperlipidemia   • Abnormal liver function tests   • Benign essential hypertension   • Cough   • Hiatal hernia with GERD   • Hordeolum of right eye   • Hypertrophy of prostate without urinary obstruction   • Irritable bowel syndrome   • Obesity (BMI 30.0-34.9)   • Primary localized osteoarthrosis, lower leg   • Seasonal allergies   • Periodic headache syndrome, not intractable   • Preventative health care   • Diarrhea of presumed infectious origin   • Preventative health care   • COVID-19 virus infection        Past Medical History:   Diagnosis Date   • Appendiceal abscess 04/2004    POST    • Dumping syndrome 2009   • GERD (gastroesophageal reflux disease)    • Hyperlipidemia    • Hypertension    • Left ACL tear 1986   • Pseudomembranous colitis 05/2004   • Right ACL tear 1996       Past Surgical History:   Procedure Laterality Date   • APPENDECTOMY  04/2004   • CHOLECYSTECTOMY  2005   • COLON SURGERY  04/2004    resection   • COLONOSCOPY     • EXPLORATORY LAPAROTOMY     • KNEE ACL RECONSTRUCTION Right 1996   • KNEE ARTHROSCOPY Left 1986   • KNEE CARTILAGE SURGERY      ACL and MCL       Family History   Problem Relation Age of Onset   •  "Osteoarthritis Father    • Heart disease Maternal Grandfather        Social History     Socioeconomic History   • Marital status:    Tobacco Use   • Smoking status: Former Smoker     Packs/day: 0.50     Years: 10.00     Pack years: 5.00     Types: Cigarettes     Quit date:      Years since quittin.4   • Smokeless tobacco: Former User     Quit date: 2019   Vaping Use   • Vaping Use: Never used   Substance and Sexual Activity   • Alcohol use: Not Currently     Alcohol/week: 4.0 standard drinks     Types: 4 Cans of beer per week   • Drug use: No   • Sexual activity: Defer       Allergies   Allergen Reactions   • Belladonna Alkaloids Unknown (See Comments)     Donnatal   • Phenobarbital Unknown (See Comments)     Donnatal       Immunization History   Administered Date(s) Administered   • Tdap 2015        Health Maintenance Due   Topic Date Due   • COVID-19 Vaccine (1) Never done   • ZOSTER VACCINE (1 of 2) Never done   • HEPATITIS C SCREENING  Never done        Review of Systems     HEENT: Denies any hearing changes, eyesight changes, no headache or dizziness  NECK: Denies any pain, stiffness, swelling or dysphagia  CHEST: Denies cough or wheeze or recent lung infections  CARDIAC: Denies chest pain, pressure or palpitations  ABD: Denies nausea, vomiting or abdominal pain  : Denies dysuria  NEURO: Denies syncope, concussion, neuropathy  PSYCH: Denies any stress  EXTREM: Denies arthritic changes myalgia or arthralgia  SKIN: Denies any rash    Vital Signs  Vitals:    22 0904   BP: 128/90   BP Location: Left arm   Patient Position: Sitting   Cuff Size: Adult   Pulse: 56   Weight: 103 kg (227 lb 1.2 oz)   Height: 185.4 cm (73\")   PainSc: 0-No pain     Body mass index is 29.96 kg/m².  BMI is >= 25 and <30. (Overweight) The following options were offered after discussion;: exercise counseling/recommendations and nutrition counseling/recommendations patient is overweight with BMI 29.96 " encouraged healthy cardiac diet with reduce carbs smaller portions and weight loss     Advance Care Planning         Current Outpatient Medications:   •  omeprazole (priLOSEC) 40 MG capsule, TAKE 1 CAPSULE BY MOUTH ONCE DAILY BEFORE A MEAL, Disp: 90 capsule, Rfl: 1  •  SUMAtriptan (IMITREX) 100 MG tablet, Take one tablet at onset of headache. May repeat dose one time in 2 hours if headache not relieved., Disp: 9 tablet, Rfl: 1    Physical Exam   HEENT: Pupils equal reactive ENT clear, no facial asymmetry, pharynx is clear  NECK: No masses bruit or thyromegaly  CHEST: Clear without rales wheezes or rhonchi  CARDIAC: Regular rhythm without gallop rub or click, blood pressure 128/88 and 126/86 mmHg, heart rate stable  ABD: Liver spleen normal, good bowel sounds, nontender  : Deferred  NEURO: Intact  PSYCH: Normal  EXTREM: No significant arthritic changes, no edema, excellent posterior tibial pulses  SKIN: Clear     Results Review:    CBC CMP lipid and PSA and TSH are pending    ECG 12 Lead    Date/Time: 6/7/2022 4:38 PM  Performed by: Shahram Napier MD  Authorized by: Shahram Napier MD   Comparison: compared with previous ECG from 12/22/2020  Similar to previous ECG  Comparison to previous ECG: Current EKG shows sinus bradycardia 57 with nonspecific T wave changes no ischemia and similar to EKG of December 22, 2020  Rhythm: sinus rhythm and sinus bradycardia  Rate: bradycardic  Conduction: conduction normal  ST Segments: ST segments normal  QRS axis: normal  Other findings: non-specific ST-T wave changes    Clinical impression: non-specific ECG  Comments: Current EKG shows sinus rhythm with sinus bradycardia 57 nonspecific T wave changes similar to EKG of 7/22/2020 asymptomatic        Patient Wellness Counseling:   Plan of care reviewed with patient at the conclusion of today's visit. Education was provided in regards to diagnosis, diet and exercise, prostate cancer screening discussed including benefit of early  detection, potential need for follow-up, and harms associated with additional management. Patient agrees to screening.    Nutrition, physical activity, healthy weight,ways to reduce stress, adequate sleep, injury prevention, misuse of tobacco, alcohol and drugs, sexual behavior and STD's, dental health, mental health, and immunizations.    Plan of care reviewed with patient at the conclusion of today's visit. Education was provided regarding diagnosis, management and any prescribed or recommended OTC medications.  Patient verbalizes understanding of and agreement with management plan.        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 and encouraged to continue exercise  Diet: Encouraged healthy cardiac diet with reduce carbs more portions and low-cholesterol and weight loss  Screening: Patient CBC CMP lipid PSA and TSH are pending  Social History     Socioeconomic History   • Marital status:    Tobacco Use   • Smoking status: Former Smoker     Packs/day: 0.50     Years: 10.00     Pack years: 5.00     Types: Cigarettes     Quit date:      Years since quittin.4   • Smokeless tobacco: Former User     Quit date: 2019   Vaping Use   • Vaping Use: Never used   Substance and Sexual Activity   • Alcohol use: Not Currently     Alcohol/week: 4.0 standard drinks     Types: 4 Cans of beer per week   • Drug use: No   • Sexual activity: Defer        Assessment/Plan:  Problem List Items Addressed This Visit        Cardiac and Vasculature    Mixed hyperlipidemia    Overview     History of mixed hyperlipidemia on atorvastatin 10 mg daily denies myalgia arthralgia continue therapy return for fasting CMP and lipid           Current Assessment & Plan     We will recheck his levels.  Currently not on statin therapy as he discontinued atorvastatin because of minor liver elevation fasting lipid and CMP are pending  encouraged healthy cardiac diet and weight loss           Relevant Orders    CBC & Differential    Comprehensive Metabolic Panel    Lipid Panel    TSH    Benign essential hypertension    Overview     History of essential hypertension previously on lisinopril 5 mg for blood pressure control however developed hyperactive airway cough lisinopril was discontinued and the patient's been placed on telmisartan 20 mg daily cough has resolved his current blood pressure is 118/78 left and right sitting continue current therapy return to clinic for fasting CMP EKG sinus rhythm with no ischemic change similar to October 19, 2018           Current Assessment & Plan     The patient's blood pressure is slightly elevated today at 128/88 mmHg. He will continue to monitor his blood pressure at home.  No longer on Micardis of 20 mg as he states his blood pressure was improved and so we discontinued CMP is pending EKG shows sinus bradycardia with a rate of 57 nonspecific T wave changes and similar to EKG of December 22, 2020           Relevant Orders    CBC & Differential    Comprehensive Metabolic Panel    Lipid Panel    TSH    ECG 12 Lead       Gastrointestinal Abdominal     Hiatal hernia with GERD    Overview     History of GERD gastritis stable on omeprazole 40 mg daily continue therapy           Current Assessment & Plan     The patient will continue omeprazole.           Relevant Medications    omeprazole (priLOSEC) 40 MG capsule    Other Relevant Orders    CBC & Differential    Comprehensive Metabolic Panel    Lipid Panel    TSH       Health Encounters    Preventative health care - Primary    Overview     Patient 52-year-old male presents for preventative visit and physical examination           Current Assessment & Plan     The patient will have lab work completed.           Relevant Orders    CBC & Differential    Comprehensive Metabolic Panel    Lipid Panel    TSH       Other    COVID-19 virus infection    Overview      "Patient tested positive for COVID infection on May 27, 2022 with mild headache mild cough and mild fatigue currently asymptomatic except for mild fatigue and mild shortness of breath he has not been vaccinated           Current Assessment & Plan     Patient's COVID infection of May 27 has minor symptoms of \"brain fog\" with minor fatigue overall doing well no therapy indicated             Other Visit Diagnoses     Special screening for malignant neoplasm of prostate        Relevant Orders    PSA Screen           Patient Instructions   Fasting lipid CBC CMP lipid TSH and PSA are pending  Continue omeprazole for gastritis  Check blood pressure weekly and if changes from his results of 125/80 please inform  Encouraged healthy cardiac diet and weight loss  Continue working exercise  Encouraged Tylenol or Advil as needed for any of the symptoms of COVID  Return in 6 months or as needed  Note patient is no longer on atorvastatin or Micardis       CMP:  Lab Results   Component Value Date    BUN 12 12/06/2021    CREATININE 0.93 12/06/2021    EGFRIFNONA 85 12/06/2021    BCR 12.9 12/06/2021     12/06/2021    K 4.6 12/06/2021    CO2 25.4 12/06/2021    CALCIUM 9.7 12/06/2021    ALBUMIN 4.80 12/06/2021    BILITOT 1.0 12/06/2021    ALKPHOS 90 12/06/2021    AST 47 (H) 12/06/2021    ALT 69 (H) 12/06/2021     HbA1c:  No results found for: HGBA1C  Microalbumin:  Lab Results   Component Value Date    MICROALBUR <1.2 09/19/2019     Lipid Panel  Lab Results   Component Value Date    CHOL 219 (H) 12/06/2021    TRIG 182 (H) 12/06/2021    HDL 56 12/06/2021     (H) 12/06/2021    AST 47 (H) 12/06/2021    ALT 69 (H) 12/06/2021        Counseling was given to patient for the following topics: disease prevention.    Plan of care reviewed with patient at the conclusion of today's visit. Education was provided regarding diagnosis, management, and any prescribed or recommended OTC medications.Patient verbalizes understanding of and " agreement with management plan.         Shahram Napier MD    Note: Part of this note may be an electronic transcription/translation of spoken language to printed text using Dragon Dictation System.      Transcribed from ambient dictation for Shahram Napier MD by Mariela Hernandez.  06/07/22   12:18 EDT    Patient verbalized consent to the visit recording.  I have personally performed the services described in this document as transcribed by the above individual, and it is both accurate and complete.  Shahram Napier MD  6/7/2022  16:40 EDT

## 2022-06-07 NOTE — ASSESSMENT & PLAN NOTE
We will recheck his levels.  Currently not on statin therapy as he discontinued atorvastatin because of minor liver elevation fasting lipid and CMP are pending encouraged healthy cardiac diet and weight loss

## 2022-06-21 DIAGNOSIS — R94.5 ABNORMAL LIVER FUNCTION: Primary | ICD-10-CM

## 2022-06-21 DIAGNOSIS — R79.89 ABNORMAL LIVER FUNCTION TESTS: ICD-10-CM

## 2022-07-05 ENCOUNTER — OFFICE (OUTPATIENT)
Dept: URBAN - METROPOLITAN AREA CLINIC 4 | Facility: CLINIC | Age: 52
End: 2022-07-05

## 2022-07-05 VITALS
SYSTOLIC BLOOD PRESSURE: 142 MMHG | SYSTOLIC BLOOD PRESSURE: 144 MMHG | DIASTOLIC BLOOD PRESSURE: 100 MMHG | DIASTOLIC BLOOD PRESSURE: 106 MMHG | WEIGHT: 224 LBS

## 2022-07-05 DIAGNOSIS — R94.5 ABNORMAL RESULTS OF LIVER FUNCTION STUDIES: ICD-10-CM

## 2022-07-05 PROCEDURE — 99204 OFFICE O/P NEW MOD 45 MIN: CPT | Performed by: NURSE PRACTITIONER

## 2022-08-02 ENCOUNTER — TELEPHONE (OUTPATIENT)
Dept: INTERNAL MEDICINE | Facility: CLINIC | Age: 52
End: 2022-08-02

## 2022-08-02 DIAGNOSIS — N28.9 LESION OF RIGHT NATIVE KIDNEY: Primary | ICD-10-CM

## 2022-08-02 NOTE — TELEPHONE ENCOUNTER
Caller: MYRA  Relationship: Self    Best call back number: [phone]    What is the best time to reach you: ANYTIME    Who are you requesting to speak with (clinical staff, provider,  specific staff member): CLINICAL    Do you know the name of the person who called: MYRA    What was the call regarding: LESION ON THE KIDNEY, WANTS TO KNOW IF OTHER TESTS NEED TO BE ORDERED TO LOOK INTO THE LESION FURTHER    Do you require a callback: YES

## 2022-08-16 ENCOUNTER — HOSPITAL ENCOUNTER (OUTPATIENT)
Dept: ULTRASOUND IMAGING | Facility: HOSPITAL | Age: 52
Discharge: HOME | End: 2022-08-16
Admitting: INTERNAL MEDICINE

## 2022-08-16 DIAGNOSIS — N28.9 LESION OF RIGHT NATIVE KIDNEY: ICD-10-CM

## 2022-08-16 PROCEDURE — 76775 US EXAM ABDO BACK WALL LIM: CPT

## 2022-10-25 RX ORDER — OMEPRAZOLE 40 MG/1
CAPSULE, DELAYED RELEASE ORAL
Qty: 90 CAPSULE | Refills: 0 | Status: SHIPPED | OUTPATIENT
Start: 2022-10-25 | End: 2023-02-24

## 2022-10-31 ENCOUNTER — OFFICE (OUTPATIENT)
Dept: URBAN - METROPOLITAN AREA CLINIC 4 | Facility: CLINIC | Age: 52
End: 2022-10-31

## 2022-10-31 ENCOUNTER — LAB (OUTPATIENT)
Dept: LAB | Facility: HOSPITAL | Age: 52
End: 2022-10-31

## 2022-10-31 VITALS
DIASTOLIC BLOOD PRESSURE: 101 MMHG | WEIGHT: 224 LBS | SYSTOLIC BLOOD PRESSURE: 137 MMHG | SYSTOLIC BLOOD PRESSURE: 139 MMHG | DIASTOLIC BLOOD PRESSURE: 98 MMHG

## 2022-10-31 DIAGNOSIS — F10.10 ALCOHOL ABUSE, UNCOMPLICATED: ICD-10-CM

## 2022-10-31 DIAGNOSIS — K75.81 NONALCOHOLIC STEATOHEPATITIS (NASH): ICD-10-CM

## 2022-10-31 DIAGNOSIS — K75.81 NONALCOHOLIC STEATOHEPATITIS: Primary | ICD-10-CM

## 2022-10-31 DIAGNOSIS — R94.5 ABNORMAL RESULTS OF LIVER FUNCTION STUDIES: ICD-10-CM

## 2022-10-31 LAB
ALBUMIN SERPL-MCNC: 4.7 G/DL (ref 3.5–5.2)
ALBUMIN/GLOB SERPL: 1.7 G/DL
ALP SERPL-CCNC: 78 U/L (ref 39–117)
ALT SERPL W P-5'-P-CCNC: 60 U/L (ref 1–41)
ANION GAP SERPL CALCULATED.3IONS-SCNC: 12.9 MMOL/L (ref 5–15)
AST SERPL-CCNC: 62 U/L (ref 1–40)
BASOPHILS # BLD AUTO: 0.02 10*3/MM3 (ref 0–0.2)
BASOPHILS NFR BLD AUTO: 0.3 % (ref 0–1.5)
BILIRUB SERPL-MCNC: 1.1 MG/DL (ref 0–1.2)
BUN SERPL-MCNC: 11 MG/DL (ref 6–20)
BUN/CREAT SERPL: 11.5 (ref 7–25)
CALCIUM SPEC-SCNC: 9.7 MG/DL (ref 8.6–10.5)
CHLORIDE SERPL-SCNC: 97 MMOL/L (ref 98–107)
CO2 SERPL-SCNC: 27.1 MMOL/L (ref 22–29)
CREAT SERPL-MCNC: 0.96 MG/DL (ref 0.76–1.27)
DEPRECATED RDW RBC AUTO: 42.6 FL (ref 37–54)
EGFRCR SERPLBLD CKD-EPI 2021: 93.9 ML/MIN/1.73
EOSINOPHIL # BLD AUTO: 0.03 10*3/MM3 (ref 0–0.4)
EOSINOPHIL NFR BLD AUTO: 0.5 % (ref 0.3–6.2)
ERYTHROCYTE [DISTWIDTH] IN BLOOD BY AUTOMATED COUNT: 11.8 % (ref 12.3–15.4)
GLOBULIN UR ELPH-MCNC: 2.8 GM/DL
GLUCOSE SERPL-MCNC: 95 MG/DL (ref 65–99)
HCT VFR BLD AUTO: 47.5 % (ref 37.5–51)
HGB BLD-MCNC: 16.7 G/DL (ref 13–17.7)
IMM GRANULOCYTES # BLD AUTO: 0.02 10*3/MM3 (ref 0–0.05)
IMM GRANULOCYTES NFR BLD AUTO: 0.3 % (ref 0–0.5)
LYMPHOCYTES # BLD AUTO: 1.67 10*3/MM3 (ref 0.7–3.1)
LYMPHOCYTES NFR BLD AUTO: 27 % (ref 19.6–45.3)
MCH RBC QN AUTO: 34.7 PG (ref 26.6–33)
MCHC RBC AUTO-ENTMCNC: 35.2 G/DL (ref 31.5–35.7)
MCV RBC AUTO: 98.8 FL (ref 79–97)
MONOCYTES # BLD AUTO: 0.53 10*3/MM3 (ref 0.1–0.9)
MONOCYTES NFR BLD AUTO: 8.6 % (ref 5–12)
NEUTROPHILS NFR BLD AUTO: 3.91 10*3/MM3 (ref 1.7–7)
NEUTROPHILS NFR BLD AUTO: 63.3 % (ref 42.7–76)
NRBC BLD AUTO-RTO: 0 /100 WBC (ref 0–0.2)
PLATELET # BLD AUTO: 188 10*3/MM3 (ref 140–450)
PMV BLD AUTO: 10.5 FL (ref 6–12)
POTASSIUM SERPL-SCNC: 4.8 MMOL/L (ref 3.5–5.2)
PROT SERPL-MCNC: 7.5 G/DL (ref 6–8.5)
RBC # BLD AUTO: 4.81 10*6/MM3 (ref 4.14–5.8)
SODIUM SERPL-SCNC: 137 MMOL/L (ref 136–145)
WBC NRBC COR # BLD: 6.18 10*3/MM3 (ref 3.4–10.8)

## 2022-10-31 PROCEDURE — 85025 COMPLETE CBC W/AUTO DIFF WBC: CPT

## 2022-10-31 PROCEDURE — 36415 COLL VENOUS BLD VENIPUNCTURE: CPT

## 2022-10-31 PROCEDURE — 99214 OFFICE O/P EST MOD 30 MIN: CPT | Performed by: NURSE PRACTITIONER

## 2022-10-31 PROCEDURE — 80053 COMPREHEN METABOLIC PANEL: CPT

## 2022-12-12 ENCOUNTER — OFFICE VISIT (OUTPATIENT)
Dept: INTERNAL MEDICINE | Facility: CLINIC | Age: 52
End: 2022-12-12

## 2022-12-12 VITALS
HEART RATE: 92 BPM | WEIGHT: 218 LBS | HEIGHT: 73 IN | BODY MASS INDEX: 28.89 KG/M2 | DIASTOLIC BLOOD PRESSURE: 94 MMHG | SYSTOLIC BLOOD PRESSURE: 120 MMHG | TEMPERATURE: 98.2 F

## 2022-12-12 DIAGNOSIS — R19.7 DIARRHEA, UNSPECIFIED TYPE: Primary | ICD-10-CM

## 2022-12-12 DIAGNOSIS — R19.7 DIARRHEA OF PRESUMED INFECTIOUS ORIGIN: ICD-10-CM

## 2022-12-12 LAB
EXPIRATION DATE: NORMAL
FLUAV AG UPPER RESP QL IA.RAPID: NOT DETECTED
FLUBV AG UPPER RESP QL IA.RAPID: NOT DETECTED
INTERNAL CONTROL: NORMAL
Lab: NORMAL
SARS-COV-2 AG UPPER RESP QL IA.RAPID: NOT DETECTED

## 2022-12-12 PROCEDURE — 87428 SARSCOV & INF VIR A&B AG IA: CPT | Performed by: INTERNAL MEDICINE

## 2022-12-12 PROCEDURE — 99214 OFFICE O/P EST MOD 30 MIN: CPT | Performed by: INTERNAL MEDICINE

## 2022-12-12 RX ORDER — DIPHENOXYLATE HYDROCHLORIDE AND ATROPINE SULFATE 2.5; .025 MG/1; MG/1
1 TABLET ORAL 4 TIMES DAILY PRN
Qty: 15 TABLET | Refills: 1 | OUTPATIENT
Start: 2022-12-12 | End: 2023-01-02

## 2022-12-12 RX ORDER — METRONIDAZOLE 500 MG/1
500 TABLET ORAL 3 TIMES DAILY
Qty: 21 TABLET | Refills: 0 | OUTPATIENT
Start: 2022-12-12 | End: 2023-01-02

## 2022-12-12 NOTE — PATIENT INSTRUCTIONS
POC COVID-19 and flu a and flu B are negative  Prescribe Flagyl 500 mg 3 times daily for 7 days  Prescribe Lomotil 4 times daily or following watery bowel movement  Avoid fried foods and avoid dairy products  Notify us if not improved  Return visit as scheduled

## 2022-12-12 NOTE — ASSESSMENT & PLAN NOTE
POC COVID 19 and flu A, and flu B-  Exam the patient's abdomen mild soreness in the left lower quadrant with normal bowel sounds no distention no rebound or guarding  The patient on Friday did consume Jif peanut butter but it was a new jar and does not feel it is the same reaction that he got from.  Decontaminated gel peanut butter in June 2021  We will treat with Lomotil 3-4 times per day as needed and add Flagyl 500 mg 3 times a day for 7 days and asked patient to consume a no fried food and no dairy products diet until the diarrhea resolves  He is to inform us of any bleeding or further discomfort or prolonged diarrhea

## 2022-12-12 NOTE — PROGRESS NOTES
[facility]     Eitan Royal  1970   [id number]      Patient Care Team:  Shahram Napier MD as PCP - General (Internal Medicine)    Chief Complaint::   Chief Complaint   Patient presents with   • Diarrhea     X 48 hours.  Denies blood. Slight nausea.  Temp 100.1 yesterday.  Not eating much because of the diarrhea.  Took Pepto            HPI    The patient is a 52-year-old male complaining of diarrhea for 48 hours, denies blood, slight nausea, temperature of 100.1. Yesterday, not eating much because of the diarrhea. He took Pepto-Bismol. With a history of migraine headache and chronic gastroesophageal reflux disease on omeprazole and Imitrexas needed. The patient has not been vaccinated for Covid-19, Covid test, point of care test is being performed.  The patient states he woke up early on 12/10/2022 with diarrhea. He adds the diarrhea lingered into the afternoon. He denies any vomiting. He admits experiencing nausea. He reports having stomach cramps. The patient states that he had an English muffin with peanut butter on 12/09/2022. He adds J[employer] peanut butter had a recall that he experienced diarrhea with. He states that this was a new jar. The patient denies being vaccinated. He adds he had a protein bar, bag of chips and pizza on 12/09/2022. The patient states that he took Pepto to relieve his symptoms. He reports that he would consume a shot of Pepto 3 times daily. He states that he experiences a little bit of back pain and within the stomach. He denies having the flu recently. The patient reports experiencing a headache on 12/11/2022. He denies experiencing a sore throat.       Patient Active Problem List   Diagnosis   • Small bowel obstruction (HCC)   • GERD without esophagitis   • Mixed hyperlipidemia   • Abnormal liver function tests   • Benign essential hypertension   • Cough   • Hiatal hernia with GERD   • Hordeolum of right eye   • Hypertrophy of prostate without urinary obstruction    • Irritable bowel syndrome   • Obesity (BMI 30.0-34.9)   • Primary localized osteoarthrosis, lower leg   • Seasonal allergies   • Periodic headache syndrome, not intractable   • Preventative health care   • Diarrhea of presumed infectious origin   • Preventative health care   • COVID-19 virus infection        Past Medical History:   Diagnosis Date   • Appendiceal abscess 2004    POST    • Dumping syndrome    • GERD (gastroesophageal reflux disease)    • Hyperlipidemia    • Hypertension    • Left ACL tear    • Pseudomembranous colitis 2004   • Right ACL tear        Past Surgical History:   Procedure Laterality Date   • APPENDECTOMY  2004   • CHOLECYSTECTOMY     • COLON SURGERY  2004    resection   • COLONOSCOPY     • EXPLORATORY LAPAROTOMY     • KNEE ACL RECONSTRUCTION Right    • KNEE ARTHROSCOPY Left    • KNEE CARTILAGE SURGERY      ACL and MCL       Family History   Problem Relation Age of Onset   • Osteoarthritis Father    • Heart disease Maternal Grandfather        Social History     Socioeconomic History   • Marital status:    Tobacco Use   • Smoking status: Former     Packs/day: 0.50     Years: 10.00     Pack years: 5.00     Types: Cigarettes     Quit date:      Years since quittin.9   • Smokeless tobacco: Former     Quit date: 2019   Vaping Use   • Vaping Use: Never used   Substance and Sexual Activity   • Alcohol use: Not Currently     Alcohol/week: 4.0 standard drinks     Types: 4 Cans of beer per week   • Drug use: No   • Sexual activity: Defer       Allergies   Allergen Reactions   • Belladonna Alkaloids Unknown (See Comments)     Donnatal   • Phenobarbital Unknown (See Comments)     Donnatal       Review of Systems     HEENT: Denies any hearing changes, eyesight changes, no headache or dizziness  NECK: Denies any pain, stiffness, swelling or dysphagia  CHEST: Denies cough or wheeze or recent lung infections  CARDIAC: Denies chest pain, pressure or  "palpitations  ABD: Denies nausea, vomiting, he does have watery diarrhea for the past 48 hours with no blood  : Denies dysuria  NEURO: Denies syncope, concussion, neuropathy  PSYCH: Denies any stress  EXTREM: Denies arthritic changes myalgia or arthralgia  SKIN: Denies any rash    Vital Signs  Vitals:    12/12/22 1124   BP: 120/94   BP Location: Right arm   Patient Position: Sitting   Cuff Size: Adult   Pulse: 92   Temp: 98.2 °F (36.8 °C)   TempSrc: Infrared   Weight: 98.9 kg (218 lb)   Height: 185.4 cm (73\")   PainSc: 0-No pain     Body mass index is 28.76 kg/m².  BMI is >= 25 and <30. (Overweight) The following options were offered after discussion;: exercise counseling/recommendations and nutrition counseling/recommendations     Advance Care Planning          Current Outpatient Medications:   •  omeprazole (priLOSEC) 40 MG capsule, TAKE 1 CAPSULE BY MOUTH ONCE DAILY BEFORE A MEAL, Disp: 90 capsule, Rfl: 0  •  SUMAtriptan (IMITREX) 100 MG tablet, Take one tablet at onset of headache. May repeat dose one time in 2 hours if headache not relieved., Disp: 9 tablet, Rfl: 1  •  diphenoxylate-atropine (Lomotil) 2.5-0.025 MG per tablet, Take 1 tablet by mouth 4 (Four) Times a Day As Needed for Diarrhea., Disp: 15 tablet, Rfl: 1  •  metroNIDAZOLE (Flagyl) 500 MG tablet, Take 1 tablet by mouth 3 (Three) Times a Day., Disp: 21 tablet, Rfl: 0    Physical Exam     ACE III MINI      HEENT: Denies any hearing changes, eyesight changes, no headache or dizziness  NECK: Denies any pain, stiffness, swelling or dysphagia  CHEST: Denies cough or wheeze or recent lung infections  CARDIAC: Denies chest pain, pressure or palpitations  ABD: Denies nausea, vomiting, patient does have active bowel sounds and some soreness in the left lower quadrant but no guarding or rebound  : Denies dysuria  NEURO: Denies syncope, concussion, neuropathy  PSYCH: Denies any stress  EXTREM: Denies arthritic changes myalgia or arthralgia  SKIN: Denies any " rash     Results Review:    Recent Results (from the past 672 hour(s))   POCT SARS-CoV-2 Antigen REED    Collection Time: 22  1:06 PM    Specimen: Swab   Result Value Ref Range    SARS Antigen Not Detected Not Detected, Presumptive Negative    Influenza A Antigen REED Not Detected Not Detected    Influenza B Antigen REED Not Detected Not Detected    Internal Control Passed Passed    Lot Number [id number]     Expiration Date 3,092,023      Procedures POC COVID and flu a and flu B are negative    Medication Review: Medications reviewed and noted    Patient wellness counseling  Exercise: Encouraged rest  Diet: No fried food no dairy products  Screening: COVID-19 and flu a and flu B are negative  Social History     Socioeconomic History   • Marital status:    Tobacco Use   • Smoking status: Former     Packs/day: 0.50     Years: 10.00     Pack years: 5.00     Types: Cigarettes     Quit date:      Years since quittin.9   • Smokeless tobacco: Former     Quit date: 2019   Vaping Use   • Vaping Use: Never used   Substance and Sexual Activity   • Alcohol use: Not Currently     Alcohol/week: 4.0 standard drinks     Types: 4 Cans of beer per week   • Drug use: No   • Sexual activity: Defer        Assessment/Plan:    Problem List Items Addressed This Visit        Gastrointestinal Abdominal     Diarrhea of presumed infectious origin    Overview     Patient complains of watery diarrhea since , denies chills or fever denies abdominal cramping denies rectal bleeding, his dietary intake on  was normal no one else in the family has been ill.  In addition the patient has been complaining of some myalgia arthralgia, has had no nausea no vomiting has been trying to hydrate with Pedialyte and fluids and generally does not feel well.         Current Assessment & Plan     POC COVID 19 and flu A, and flu B-  Exam the patient's abdomen mild soreness in the left lower quadrant with normal bowel sounds  no distention no rebound or guarding  The patient on Friday did consume Jif peanut butter but it was a new jar and does not feel it is the same reaction that he got from.  Decontaminated gel peanut butter in June 2021  We will treat with Lomotil 3-4 times per day as needed and add Flagyl 500 mg 3 times a day for 7 days and asked patient to consume a no fried food and no dairy products diet until the diarrhea resolves  He is to inform us of any bleeding or further discomfort or prolonged diarrhea        Other Visit Diagnoses     Diarrhea, unspecified type    -  Primary    Lomotil ordered, take up to 4 times daily. Metronidazole ordered, 3 times daily.             Patient Instructions   POC COVID-19 and flu a and flu B are negative  Prescribe Flagyl 500 mg 3 times daily for 7 days  Prescribe Lomotil 4 times daily or following watery bowel movement  Avoid fried foods and avoid dairy products  Notify us if not improved  Return visit as scheduled       Plan of care reviewed with patient at the conclusion of today's visit. Education was provided regarding diagnosis, management, and any prescribed or recommended OTC medications.Patient verbalizes understanding of and agreement with management plan.         Shahram Napier MD      Note: Part of this note may be an electronic transcription/translation of spoken language to printed text using the Dragon Dictation system.

## 2023-01-06 ENCOUNTER — TELEPHONE (OUTPATIENT)
Dept: INTERNAL MEDICINE | Facility: CLINIC | Age: 53
End: 2023-01-06
Payer: COMMERCIAL

## 2023-01-06 NOTE — TELEPHONE ENCOUNTER
BIT BY A OLDER KITTEN Sunday Monday WENT TO URGENT CARE GAVE HIM MEDICINE FINGER IS SWOLLEN AND CANNOT BEND FINGER AND MEDICINE IS NOT WORKING REQUESTED A SAME DAY APPOINTMENT WE DIDN'T HAVE ONE.

## 2023-02-10 ENCOUNTER — TELEPHONE (OUTPATIENT)
Dept: INTERNAL MEDICINE | Facility: CLINIC | Age: 53
End: 2023-02-10
Payer: COMMERCIAL

## 2023-02-10 NOTE — TELEPHONE ENCOUNTER
Impossible for us to know what's going on without him being seen.  He does have risk factors for heart disease, so there is some possibility the episode could be heart related.  If symptoms resolve completely and don't return, suggest he see Dr Napier next week.  But if he has any further similar symptoms should go to ER.

## 2023-02-10 NOTE — TELEPHONE ENCOUNTER
Pt wife called stating that the pt was out in the yard working, came in and bent down and started getting hot and sweaty. He laid down on the couch and went to  his cat and said that his arms felt weak. BP is normal. States he was feeling weak and dizzy. He asked for something sweet to eat because he felt like he needed something sweet. Started feeling a little better about 15 minutes after that. Wife is concerned going into the weekend and would like a call back.

## 2023-02-24 RX ORDER — OMEPRAZOLE 40 MG/1
CAPSULE, DELAYED RELEASE ORAL
Qty: 90 CAPSULE | Refills: 1 | Status: SHIPPED | OUTPATIENT
Start: 2023-02-24

## 2023-05-18 ENCOUNTER — OFFICE (OUTPATIENT)
Dept: URBAN - METROPOLITAN AREA CLINIC 4 | Facility: CLINIC | Age: 53
End: 2023-05-18

## 2023-05-18 VITALS
DIASTOLIC BLOOD PRESSURE: 99 MMHG | DIASTOLIC BLOOD PRESSURE: 97 MMHG | SYSTOLIC BLOOD PRESSURE: 146 MMHG | SYSTOLIC BLOOD PRESSURE: 133 MMHG | WEIGHT: 232 LBS

## 2023-05-18 DIAGNOSIS — K75.81 NONALCOHOLIC STEATOHEPATITIS (NASH): ICD-10-CM

## 2023-05-18 DIAGNOSIS — R10.11 RIGHT UPPER QUADRANT PAIN: ICD-10-CM

## 2023-05-18 DIAGNOSIS — R94.5 ABNORMAL RESULTS OF LIVER FUNCTION STUDIES: ICD-10-CM

## 2023-05-18 DIAGNOSIS — Z87.891 PERSONAL HISTORY OF NICOTINE DEPENDENCE: ICD-10-CM

## 2023-05-18 DIAGNOSIS — R63.5 ABNORMAL WEIGHT GAIN: ICD-10-CM

## 2023-05-18 DIAGNOSIS — F10.10 ALCOHOL ABUSE, UNCOMPLICATED: ICD-10-CM

## 2023-05-18 PROCEDURE — 99214 OFFICE O/P EST MOD 30 MIN: CPT | Performed by: NURSE PRACTITIONER

## 2023-06-19 ENCOUNTER — OFFICE VISIT (OUTPATIENT)
Dept: INTERNAL MEDICINE | Facility: CLINIC | Age: 53
End: 2023-06-19
Payer: COMMERCIAL

## 2023-06-19 VITALS
SYSTOLIC BLOOD PRESSURE: 130 MMHG | WEIGHT: 225 LBS | HEIGHT: 72 IN | BODY MASS INDEX: 30.48 KG/M2 | HEART RATE: 68 BPM | DIASTOLIC BLOOD PRESSURE: 98 MMHG

## 2023-06-19 DIAGNOSIS — K21.9 HIATAL HERNIA WITH GERD: ICD-10-CM

## 2023-06-19 DIAGNOSIS — I10 BENIGN ESSENTIAL HYPERTENSION: ICD-10-CM

## 2023-06-19 DIAGNOSIS — G43.C0 PERIODIC HEADACHE SYNDROME, NOT INTRACTABLE: ICD-10-CM

## 2023-06-19 DIAGNOSIS — Z00.00 PREVENTATIVE HEALTH CARE: Primary | ICD-10-CM

## 2023-06-19 DIAGNOSIS — E66.9 OBESITY (BMI 30.0-34.9): ICD-10-CM

## 2023-06-19 DIAGNOSIS — Z12.5 SPECIAL SCREENING FOR MALIGNANT NEOPLASM OF PROSTATE: ICD-10-CM

## 2023-06-19 DIAGNOSIS — K44.9 HIATAL HERNIA WITH GERD: ICD-10-CM

## 2023-06-19 NOTE — PROGRESS NOTES
[facility]     Eitan Royal  1970   [id number]      Patient Care Team:  Shahram Napier MD as PCP - General (Internal Medicine)    Chief Complaint::   Chief Complaint   Patient presents with    Annual Exam     Patient is fasting            HPI  The patient is a 53-year-old male who presents for an annual preventative visit and physical examination.    The patient is doing well. He is staying busy and active. He denies any recent health changes. He injured his right thigh approximately 2 months ago when he hit it in front of his truck while walking, and he notes that he had a bruise after. He could barely walk for about 2 days after the incident because his right leg was swollen and he could not bend it. He did ice compression, but he notes that he did not do heat compression. He applied a salve ointment. He can walk decently approximately 1 to 2 weeks later, but he notes that it was still bothering him. He had severe bruising on his right knee. His wife is worried that he has a clot. He is doing better now.     The patient denies any headache or dizziness. His vision is doing well. He took approximately 5 pills of Allegra-D about 1 month ago, which he notes resolved his allergies. He usually suffers from allergies during the spring and fall seasons. He denies any sore throat, bleeding gums, trouble swallowing, coughing, wheezing, or shortness of breath. He is physically active. He denies any chest pain, heart skipping, upset stomach, nausea, or vomiting. He denies any changes in his bowel habits, constipation, diarrhea, or rectal bleeding. He does not wake up at night to urinate. He denies any lower back pain. He lifts occasionally, but he tries to be careful. He denies any issues with both his knees and ankles.     The patient's EKG today is unchanged, which shows a regular heart rate and rhythm with a heart rate of 68 bpm and nonspecific T changes. His weight today is 225 pounds. He has  gained approximately 5 pounds since 01/2023. His diastolic blood pressure today is slightly elevated. He monitors his blood pressure at home intermittently. His blood pressure readings at home are sometimes slightly elevated, but they usually range at 125/80 mmHg. His blood pressure today is 130/888 mmHg. His blood pressure while lying down today is 122/82 mmHg.     The patient wears ear protection. He saw RUDDY Guillermo, who monitors his liver, a few weeks ago, during which he had laboratory work ordered, but he notes that he has not done it yet because he knows that he is seeing this provider today. He has enough refills left for his prescriptions.       Patient Active Problem List   Diagnosis    Small bowel obstruction    GERD without esophagitis    Mixed hyperlipidemia    Abnormal liver function tests    Benign essential hypertension    Cough    Hiatal hernia with GERD    Hordeolum of right eye    Hypertrophy of prostate without urinary obstruction    Irritable bowel syndrome    Obesity (BMI 30.0-34.9)    Primary localized osteoarthrosis, lower leg    Seasonal allergies    Periodic headache syndrome, not intractable    Preventative health care    Diarrhea of presumed infectious origin    Preventative health care    COVID-19 virus infection    SMITH (nonalcoholic steatohepatitis)    Preventative health care        Past Medical History:   Diagnosis Date    Appendiceal abscess 04/2004    POST     Dumping syndrome 2009    GERD (gastroesophageal reflux disease)     Hyperlipidemia     Hypertension     Left ACL tear 1986    Pseudomembranous colitis 05/2004    Right ACL tear 1996       Past Surgical History:   Procedure Laterality Date    APPENDECTOMY  04/2004    CHOLECYSTECTOMY  2005    COLON SURGERY  04/2004    resection    COLONOSCOPY      EXPLORATORY LAPAROTOMY      KNEE ACL RECONSTRUCTION Right 1996    KNEE ARTHROSCOPY Left 1986    KNEE CARTILAGE SURGERY      ACL and MCL       Family History   Problem Relation  "Age of Onset    Osteoarthritis Father     Heart disease Maternal Grandfather        Social History     Socioeconomic History    Marital status:    Tobacco Use    Smoking status: Former     Packs/day: 0.50     Years: 10.00     Pack years: 5.00     Types: Cigarettes     Quit date: 2008     Years since quitting: 15.4    Smokeless tobacco: Former     Quit date: 9/5/2019   Vaping Use    Vaping Use: Never used   Substance and Sexual Activity    Alcohol use: Not Currently     Alcohol/week: 4.0 standard drinks     Types: 4 Cans of beer per week    Drug use: No    Sexual activity: Defer       Allergies   Allergen Reactions    Belladonna Alkaloids Unknown (See Comments)     Donnatal    Phenobarbital Unknown (See Comments)     Donnatal       Immunization History   Administered Date(s) Administered    TD Preservative Free 01/02/2023    Tdap 07/23/2015        Health Maintenance Due   Topic Date Due    ZOSTER VACCINE (1 of 2) Never done    HEPATITIS C SCREENING  Never done        Review of Systems     HEENT: Denies any hearing changes, eyesight changes, no headache or dizziness no recent headache  NECK: Denies any pain, stiffness, swelling or dysphagia  CHEST: Denies cough, wheeze, or recent lung infections  CARDIAC: Denies chest pain, pressure, or palpitations  ABD: Denies nausea, vomiting, or abdominal pain  : Denies dysuria  NEURO: Denies syncope, concussion, neuropathy  PSYCH: Denies any stress  EXTREM: Denies arthritic changes myalgia or arthralgia right back injury approximately 2 months ago has resolved  SKIN: Denies any rash    Vital Signs  Vitals:    06/19/23 0905   BP: 130/98   BP Location: Left arm   Patient Position: Sitting   Cuff Size: Adult   Pulse: 68   Weight: 102 kg (225 lb)   Height: 182.9 cm (72\")     Body mass index is 30.52 kg/m².  BMI is >= 30 and <35. (Class 1 Obesity). The following options were offered after discussion;: nutrition counseling/recommendations     Advance Care Planning "         Current Outpatient Medications:     omeprazole (priLOSEC) 40 MG capsule, TAKE 1 CAPSULE BY MOUTH ONCE DAILY BEFORE A MEAL, Disp: 90 capsule, Rfl: 1    SUMAtriptan (IMITREX) 100 MG tablet, Take one tablet at onset of headache. May repeat dose one time in 2 hours if headache not relieved., Disp: 9 tablet, Rfl: 1    Physical Exam     HEENT: Pupils equal reactive ENT clear, no facial asymmetry, pharynx is clear. Both ears are normal. No soreness in his sinuses.   NECK: No masses, bruit, or thyromegaly  CHEST: Clear without rales, wheezes, or rhonchi  CARDIAC: Regular rhythm without gallop, rub, or click, blood pressure 130/88 mmHg, heart rate stable  ABD: Liver spleen normal, good bowel sounds, nontender  : Deferred  NEURO: Intact  PSYCH: Normal  EXTREM: No significant arthritic changes, no edema. Good circulation in the right ankle.  No residual from right anterior thigh injury approximate 2 months ago  SKIN: Few abrasions on that left lower leg.     Results Review:    Fasting lab pending    ECG 12 Lead    Date/Time: 6/19/2023 5:23 PM  Performed by: Shahram Napier MD  Authorized by: Shahram Napier MD   Comparison: compared with previous ECG from 6/7/2022  Similar to previous ECG  Comparison to previous ECG: Today's EKG shows sinus rhythm with nonspecific T wave changes similar to EKG of June 7, 2022 except bradycardia arrhythmia is no longer present and the patient continues to have nonspecific T wave changes no ischemia  Rhythm: sinus rhythm  Rate: normal  Conduction: conduction normal  ST Segments: ST segments normal  QRS axis: normal  Other findings: non-specific ST-T wave changes    Clinical impression: non-specific ECG  Comments: Patient's current EKG shows sinus rhythm with nonspecific T wave changes no ischemia similar to EKG of June 7, 2022 except the bradycardia arrhythmia has resolved      Patient Wellness Counseling:   Plan of care reviewed with patient at the conclusion of today's visit. Education  was provided in regards to diagnosis, diet and exercise, prostate cancer screening discussed including benefit of early detection, potential need for follow-up, and harms associated with additional management. Patient agrees to screening.    Nutrition, physical activity, healthy weight,ways to reduce stress, adequate sleep, injury prevention, misuse of tobacco, alcohol and drugs, sexual behavior and STD's, dental health, mental health, and immunizations.    Plan of care reviewed with patient at the conclusion of today's visit. Education was provided regarding diagnosis, management and any prescribed or recommended OTC medications.  Patient verbalizes understanding of and agreement with management plan.    Medication Review: Medications reviewed and noted    Patient wellness counseling  Exercise: Continue ADL and active outdoor lifestyle, working in the [employer] business  Diet: Healthy cardiac diet with reduce carbs no salt  Screening: Fasting labs pending  Social History     Socioeconomic History    Marital status:    Tobacco Use    Smoking status: Former     Packs/day: 0.50     Years: 10.00     Pack years: 5.00     Types: Cigarettes     Quit date: 2008     Years since quitting: 15.4    Smokeless tobacco: Former     Quit date: 9/5/2019   Vaping Use    Vaping Use: Never used   Substance and Sexual Activity    Alcohol use: Not Currently     Alcohol/week: 4.0 standard drinks     Types: 4 Cans of beer per week    Drug use: No    Sexual activity: Defer        Assessment/Plan:  Problem List Items Addressed This Visit          Cardiac and Vasculature    Benign essential hypertension    Overview     History of essential hypertension previously on lisinopril 5 mg for blood pressure control however developed hyperactive airway cough lisinopril was discontinued and the patient's been placed on telmisartan 20 mg daily cough has resolved his current blood pressure is 118/78 left and right sitting continue current therapy  return to clinic for fasting CMP EKG sinus rhythm with no ischemic change similar to October 19, 2018         Current Assessment & Plan       Essential hypertension with current blood pressure 130/98 repeated 132/82 taking the right arm supine.  The patient is not on current medication he is overweight at 225 pounds with a BMI of 30.52 his EKG shows sinus rhythm with nonspecific T wave changes which is similar to the EKG of June 7, 2022 except bradycardia is no longer present with a current heart rate of 68 and no ischemia.  CMP is pending we will not begin therapy at this time         Relevant Orders    CBC & Differential (Completed)    Comprehensive Metabolic Panel (Completed)    Lipid Panel (Completed)    TSH (Completed)    ECG 12 Lead       Endocrine and Metabolic    Obesity (BMI 30.0-34.9)    Overview     Patient's current weight is 225 pounds with a BMI of 30.52         Current Assessment & Plan       Patient is overweight he is large muscular build and weight of 225 pounds with a BMI of 30.52 which suggest cardiac healthy diet and weight loss with reduce salt and reduce carbs.         Relevant Orders    CBC & Differential (Completed)    Comprehensive Metabolic Panel (Completed)    Lipid Panel (Completed)    TSH (Completed)       Gastrointestinal Abdominal     Hiatal hernia with GERD    Overview     History of GERD gastritis stable on omeprazole 40 mg daily continue therapy         Current Assessment & Plan     Hiatal hernia with reflux esophagitis currently stable on omeprazole 40 mg daily continue current therapy         Relevant Medications    omeprazole (priLOSEC) 40 MG capsule    Other Relevant Orders    CBC & Differential (Completed)    Comprehensive Metabolic Panel (Completed)    Lipid Panel (Completed)    TSH (Completed)       Health Encounters    Preventative health care - Primary    Overview     Patient 53-year-old male presents for annual preventative visit and physical examination         Relevant  Orders    CBC & Differential (Completed)    Comprehensive Metabolic Panel (Completed)    Lipid Panel (Completed)    TSH (Completed)       Neuro    Periodic headache syndrome, not intractable    Overview     Infrequent headaches this particular headache is present for the past 18 hours with posterior headache, some minor visual changes, mild nausea, generally not feeling well, at approximately 1:00 this morning the patient Advil migraine with some minor relief.         Current Assessment & Plan       Patient does have an occasional headache thought to be a variant of migraine he has had relief with Imitrex, in the past the patient has utilized Advil and would suggest as needed therapy there is no pattern and somewhat related to stress aggravated migraine continue current as needed therapy         Relevant Medications    SUMAtriptan (IMITREX) 100 MG tablet    Other Relevant Orders    CBC & Differential (Completed)    Comprehensive Metabolic Panel (Completed)    Lipid Panel (Completed)    TSH (Completed)     Other Visit Diagnoses       Special screening for malignant neoplasm of prostate        Relevant Orders    CBC & Differential (Completed)    Comprehensive Metabolic Panel (Completed)    Lipid Panel (Completed)    TSH (Completed)    PSA Screen (Completed)             Patient Instructions   Fasting CBC CMP lipid TSH PSA pending  Copy of lab to Neda [facility] with [facility]  EKG nonspecific T wave changes unchanged  Continue current therapy  Continue healthy exercise and ADL  Encouraged weight loss healthy cardiac diet  Return visit in 6 months or as needed     CMP:  Lab Results   Component Value Date    BUN 12 06/19/2023    CREATININE 1.03 06/19/2023    EGFRIFNONA 85 12/06/2021    BCR 11.7 06/19/2023     06/19/2023    K 4.8 06/19/2023    CO2 23.9 06/19/2023    CALCIUM 9.9 06/19/2023    ALBUMIN 4.7 06/19/2023    BILITOT 1.3 (H) 06/19/2023    ALKPHOS 77 06/19/2023    AST 47 (H) 06/19/2023    ALT 57 (H)  06/19/2023     HbA1c:  No results found for: HGBA1C  Microalbumin:  Lab Results   Component Value Date    MICROALBUR <1.2 09/19/2019     Lipid Panel  Lab Results   Component Value Date    CHOL 253 (H) 06/19/2023    TRIG 142 06/19/2023    HDL 62 (H) 06/19/2023     (H) 06/19/2023    AST 47 (H) 06/19/2023    ALT 57 (H) 06/19/2023        Counseling was given to patient for the following topics: disease prevention.    Plan of care reviewed with patient at the conclusion of today's visit. Education was provided regarding diagnosis, management, and any prescribed or recommended OTC medications.Patient verbalizes understanding of and agreement with management plan.      Transcribed from ambient dictation for Shahram Napier MD by Jose Luis Cantu.  06/19/23   11:00 EDT    Patient or patient representative verbalized consent to the visit recording.  I have personally performed the services described in this document as transcribed by the above individual, and it is both accurate and complete.

## 2023-06-19 NOTE — PATIENT INSTRUCTIONS
Fasting CBC CMP lipid TSH PSA pending  Copy of lab to Neda FOX with [facility]  EKG nonspecific T wave changes unchanged  Continue current therapy  Continue healthy exercise and ADL  Encouraged weight loss healthy cardiac diet  Return visit in 6 months or as needed

## 2023-06-19 NOTE — ASSESSMENT & PLAN NOTE
Patient is overweight he is large muscular build and weight of 225 pounds with a BMI of 30.52 which suggest cardiac healthy diet and weight loss with reduce salt and reduce carbs.

## 2023-06-19 NOTE — ASSESSMENT & PLAN NOTE
Essential hypertension with current blood pressure 130/98 repeated 132/82 taking the right arm supine.  The patient is not on current medication he is overweight at 225 pounds with a BMI of 30.52 his EKG shows sinus rhythm with nonspecific T wave changes which is similar to the EKG of June 7, 2022 except bradycardia is no longer present with a current heart rate of 68 and no ischemia.  CMP is pending we will not begin therapy at this time

## 2023-06-19 NOTE — ASSESSMENT & PLAN NOTE
Hiatal hernia with reflux esophagitis currently stable on omeprazole 40 mg daily continue current therapy

## 2023-06-19 NOTE — ASSESSMENT & PLAN NOTE
Patient does have an occasional headache thought to be a variant of migraine he has had relief with Imitrex, in the past the patient has utilized Advil and would suggest as needed therapy there is no pattern and somewhat related to stress aggravated migraine continue current as needed therapy

## 2023-07-03 PROBLEM — S70.361A TICK BITE OF RIGHT THIGH: Status: ACTIVE | Noted: 2023-07-03
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
## 2023-07-03 PROBLEM — S70.361A TICK BITE OF RIGHT THIGH: Status: RESOLVED | Noted: 2023-07-03 | Resolved: 2023-07-03

## 2023-08-14 RX ORDER — OMEPRAZOLE 40 MG/1
CAPSULE, DELAYED RELEASE ORAL
Qty: 90 CAPSULE | Refills: 0 | Status: SHIPPED | OUTPATIENT
Start: 2023-08-14

## 2023-12-06 RX ORDER — OMEPRAZOLE 40 MG/1
CAPSULE, DELAYED RELEASE ORAL
Qty: 90 CAPSULE | Refills: 0 | Status: SHIPPED | OUTPATIENT
Start: 2023-12-06

## 2024-03-29 RX ORDER — OMEPRAZOLE 40 MG/1
CAPSULE, DELAYED RELEASE ORAL
Qty: 90 CAPSULE | Refills: 0 | Status: SHIPPED | OUTPATIENT
Start: 2024-03-29

## 2024-06-03 ENCOUNTER — TELEPHONE (OUTPATIENT)
Dept: INTERNAL MEDICINE | Facility: CLINIC | Age: 54
End: 2024-06-03
Payer: COMMERCIAL

## 2024-06-03 DIAGNOSIS — I10 BENIGN ESSENTIAL HYPERTENSION: ICD-10-CM

## 2024-06-03 DIAGNOSIS — E78.2 MIXED HYPERLIPIDEMIA: Primary | ICD-10-CM

## 2024-06-03 NOTE — TELEPHONE ENCOUNTER
Caller: Eitan Royal    Relationship: Self    Best call back number: [phone]     What orders are you requesting (i.e. lab or imaging): BLOOD WORK    In what timeframe would the patient need to come in: BEFORE APPOINTMENT ON 6/18/24    Where will you receive your lab/imaging services: IN OFFICE    Additional notes: CALL BACK WHEN ORDERS ARE PLACED

## 2024-06-16 PROBLEM — Z00.00 PREVENTATIVE HEALTH CARE: Status: ACTIVE | Noted: 2024-06-16

## 2024-06-18 ENCOUNTER — OFFICE VISIT (OUTPATIENT)
Dept: INTERNAL MEDICINE | Facility: CLINIC | Age: 54
End: 2024-06-18
Payer: COMMERCIAL

## 2024-06-18 VITALS
OXYGEN SATURATION: 99 % | HEART RATE: 64 BPM | WEIGHT: 218 LBS | SYSTOLIC BLOOD PRESSURE: 120 MMHG | DIASTOLIC BLOOD PRESSURE: 90 MMHG | HEIGHT: 72 IN | BODY MASS INDEX: 29.53 KG/M2

## 2024-06-18 DIAGNOSIS — Z00.00 PREVENTATIVE HEALTH CARE: Primary | ICD-10-CM

## 2024-06-18 DIAGNOSIS — Z12.5 SPECIAL SCREENING FOR MALIGNANT NEOPLASM OF PROSTATE: ICD-10-CM

## 2024-06-18 DIAGNOSIS — G47.39 OTHER SLEEP APNEA: ICD-10-CM

## 2024-06-18 DIAGNOSIS — K21.9 GERD WITHOUT ESOPHAGITIS: ICD-10-CM

## 2024-06-18 DIAGNOSIS — R79.89 ABNORMAL LIVER FUNCTION TESTS: ICD-10-CM

## 2024-06-18 DIAGNOSIS — E78.2 MIXED HYPERLIPIDEMIA: ICD-10-CM

## 2024-06-18 DIAGNOSIS — Z82.49 FAMILY HISTORY OF CARDIOVASCULAR DISEASE: ICD-10-CM

## 2024-06-18 PROCEDURE — 99396 PREV VISIT EST AGE 40-64: CPT | Performed by: INTERNAL MEDICINE

## 2024-06-18 PROCEDURE — 99214 OFFICE O/P EST MOD 30 MIN: CPT | Performed by: INTERNAL MEDICINE

## 2024-06-18 NOTE — PROGRESS NOTES
[facility]     Eitan Royal  1970   [id number]      Patient Care Team:  Shahram Napier MD as PCP - General (Internal Medicine)    Chief Complaint::   Chief Complaint   Patient presents with    Annual Exam     Lab order in but not collected yet.  Patient is not fasting    family history change     Mother with leaky tricuspid valve and mitral valve; brother also with a valve issues requiring open heart surgery            HPI  History of Present Illness  The patient is a 54-year-old male who presents for annual preventive visit and physical exam with a history of GERD, hyperlipidemia, abnormal liver functions with a family history of mother with a leaky tricuspid valve and mitral valve. Brother also has valve issues requiring open heart surgery.    The patient's mother, a nurse, has been on anticoagulant therapy for an extended period and is 88 years old. She has been experiencing dyspnea, despite maintaining an active lifestyle and engaging in daily walks and square dancing. The patient's brother, aged 64, underwent open-heart surgery on his tricuspid valve and had some form of heart disease. The patient's mother also suffers from valvular disease.    The patient's wife suspects he may have sleep apnea due to occasional snoring and brief cessation of breathing during sleep. He occasionally experiences a sore throat upon awakening due to excessive snoring.    The patient denies experiencing headaches or dizziness. He experienced a brief period of allergies this spring, for which he took 3 Allegra-D pills. He suspects he may have hearing loss, although he has not undergone annual testing in [facility]. He denies experiencing coughing, wheezing, chest pain, pressure, or palpitations. He denies dysphagia, abdominal issues, or bladder issues. He reports no injuries or falls. He experiences difficulty hearing in crowded situations, such as when people are saying something and their voice is going away  from him. He denies tinnitus.    Supplemental Information  He had knee surgery for a torn ACL and medial collateral in 1998 and 1998. He was doing good this spring. He was down to 210 pounds. He was eating healthy, but then he started doing bad again. He drinks a few pops and cakes and candy. He is still on omeprazole for his stomach. He has not had a migraine in a long time. He has not taken his Imitrex.   His mother is 88 years old and has some valvular disease. She has been on blood thinner for years. She has been short of breath. She stays active and does well. She walks every day with the dog. She likes to square dance as well. His father and mother had hearing issues.         Patient Active Problem List   Diagnosis    Small bowel obstruction    GERD without esophagitis    Mixed hyperlipidemia    Abnormal liver function tests    Benign essential hypertension    Cough    Hiatal hernia with GERD    Hordeolum of right eye    Hypertrophy of prostate without urinary obstruction    Irritable bowel syndrome    Obesity (BMI 30.0-34.9)    Primary localized osteoarthrosis, lower leg    Seasonal allergies    Periodic headache syndrome, not intractable    Preventative health care    Diarrhea of presumed infectious origin    Preventative health care    COVID-19 virus infection    SMITH (nonalcoholic steatohepatitis)    Preventative health care    Tick bite of right thigh    Preventative health care    Other sleep apnea    Family history of cardiovascular disease        Past Medical History:   Diagnosis Date    Appendiceal abscess 04/2004    POST     Dumping syndrome 2009    GERD (gastroesophageal reflux disease)     Hyperlipidemia     Hypertension     Left ACL tear 1986    Pseudomembranous colitis 05/2004    Right ACL tear 1996       Past Surgical History:   Procedure Laterality Date    APPENDECTOMY  04/2004    CHOLECYSTECTOMY  2005    COLON SURGERY  04/2004    resection    COLONOSCOPY      EXPLORATORY LAPAROTOMY      KNEE  ACL RECONSTRUCTION Right 1996    KNEE ARTHROSCOPY Left     KNEE CARTILAGE SURGERY      ACL and MCL       Family History   Problem Relation Age of Onset    Osteoarthritis Father     Heart disease Maternal Grandfather        Social History     Socioeconomic History    Marital status:    Tobacco Use    Smoking status: Former     Current packs/day: 0.00     Average packs/day: 0.5 packs/day for 10.0 years (5.0 ttl pk-yrs)     Types: Cigarettes     Start date:      Quit date:      Years since quittin.4    Smokeless tobacco: Former     Quit date: 2019   Vaping Use    Vaping status: Never Used   Substance and Sexual Activity    Alcohol use: Not Currently     Alcohol/week: 4.0 standard drinks of alcohol     Types: 4 Cans of beer per week    Drug use: No    Sexual activity: Defer       Allergies   Allergen Reactions    Belladonna Alkaloids Unknown (See Comments)     Donnatal    Phenobarbital Unknown (See Comments)     Donnatal       Immunization History   Administered Date(s) Administered    TD Preservative Free (Tenivac) 2023    Tdap 2015        Health Maintenance Due   Topic Date Due    ZOSTER VACCINE (1 of 2) Never done    HEPATITIS C SCREENING  Never done    COVID-19 Vaccine (2023- season) Never done    LIPID PANEL  2024        Review of Systems   Constitutional: Negative.    HENT: Negative.     Eyes: Negative.    Respiratory:  Positive for apnea.    Gastrointestinal: Negative.    Endocrine: Negative.    Genitourinary: Negative.    Musculoskeletal:  Positive for arthralgias.        Bilateral knee surgery for ACL and meniscus   Skin: Negative.    Allergic/Immunologic: Positive for environmental allergies.   Neurological: Negative.    Hematological: Negative.    Psychiatric/Behavioral: Negative.        Physical Exam  Heart rhythm is regular. No murmur detected.    Vital Signs  Weight is 218 pounds.       HEENT: No facial asymmetry has nose mouth and throat and sinuses  "normal  NECK: No mass or thyromegaly neck vein distention  CHEST: Clear without rales wheezes or or masses  CARDIAC: Regular rhythm without gallop rub click or murmur blood pressure heart rate stable  ABD: Liver spleen normal positive bowel sounds no bruit  : Deferred  NEURO: Intact  PSYCH: Normal  EXTREM: Remote bilateral knee arthroscopic surgery for ACL and meniscus    Vital Signs  Vitals:    06/18/24 1450   BP: 120/90   BP Location: Left arm   Patient Position: Sitting   Cuff Size: Adult   Pulse: 64   SpO2: 99%   Weight: 98.9 kg (218 lb)   Height: 182.9 cm (72\")   PainSc: 0-No pain     Body mass index is 29.57 kg/m².  BMI is >= 25 and <30. (Overweight) The following options were offered after discussion;: exercise counseling/recommendations and nutrition counseling/recommendations     Advance Care Planning         Current Outpatient Medications:     omeprazole (priLOSEC) 40 MG capsule, TAKE 1 CAPSULE BY MOUTH ONCE DAILY BEFORE A MEAL, Disp: 90 capsule, Rfl: 0    Physical Exam   HEENT: Pupils equal reactive ENT clear no facial asymmetry pharynx is clear  NECK: No mass birth, clinically distention  CHEST: Clear without rales wheezes or rhonchi  CARDIAC: Regular rhythm no gallop rub click or murmur  ABD: Liver and spleen normal positive bowel sounds no bruit  : Deferred  NEURO: Intact  PSYCH: Normal  EXTREM: Remote repair left and right knee ACL and meniscus  Skin: No rash     Results Review:    Results       Procedures echocardiogram family with valvular heart disease requiring surgery  Sleep apnea consult with pulmonary    Patient Wellness Counseling:   Plan of care reviewed with patient at the conclusion of today's visit. Education was provided in regards to diagnosis, diet and exercise, prostate cancer screening discussed including benefit of early detection, potential need for follow-up, and harms associated with additional management. Patient agrees to screening.    Nutrition, physical activity, healthy " weight,ways to reduce stress, adequate sleep, injury prevention, misuse of tobacco, alcohol and drugs, sexual behavior and STD's, dental health, mental health, and immunizations.    Plan of care reviewed with patient at the conclusion of today's visit. Education was provided regarding diagnosis, management and any prescribed or recommended OTC medications.  Patient verbalizes understanding of and agreement with management plan.      Medication Review: Medications reviewed and noted    Patient wellness counseling  Exercise: Continue ADL  Diet: Healthy cardiac diet and weight loss  Screening: CBC CMP lipid TSH and PSA pending  Social History     Socioeconomic History    Marital status:    Tobacco Use    Smoking status: Former     Current packs/day: 0.00     Average packs/day: 0.5 packs/day for 10.0 years (5.0 ttl pk-yrs)     Types: Cigarettes     Start date:      Quit date:      Years since quittin.4    Smokeless tobacco: Former     Quit date: 2019   Vaping Use    Vaping status: Never Used   Substance and Sexual Activity    Alcohol use: Not Currently     Alcohol/week: 4.0 standard drinks of alcohol     Types: 4 Cans of beer per week    Drug use: No    Sexual activity: Defer        Assessment/Plan:  Assessment & Plan  1. Annual preventive visit and physical exam.  The patient's blood pressure is well-regulated. Laboratory tests will be ordered. An echocardiogram will be ordered. A consultation with a sleep medicine pulmonologist will be arranged.    Follow-up  A follow-up visit is scheduled for 1 year from now.     Problem List Items Addressed This Visit          Cardiac and Vasculature    Mixed hyperlipidemia    Overview     History of mixed hyperlipidemia on atorvastatin 10 mg daily denies myalgia arthralgia continue therapy return for fasting CMP and lipid         Current Assessment & Plan      Mixed hyperlipidemia no longer on statins because of mild elevated liver function studies with the  last lab on June 19, 2023 cholesterol 253  and HDL 62 encouraged healthy cardiac diet with reduced carbs and reduce cholesterol fasting CMP and lipid are pending         Relevant Orders    PSA Screen       Family History    Family history of cardiovascular disease    Overview     Patient states mother has a leaky tricuspid insufficiency and mitral valve disease and brother has heart valve disease requiring open heart surgery  Patient's current blood pressure is stable 120/90 heart rate is 64 O2 saturation 99% heart rhythm is regular I hear no murmur gallop or rub         Current Assessment & Plan     Will obtain echocardiogram for valve definition and family history of valvular heart disease in mother and brother         Relevant Orders    Adult Transthoracic Echo Complete W/ Cont if Necessary Per Protocol       Gastrointestinal Abdominal     GERD without esophagitis    Overview     GERD esophagitis on omeprazole 40 mg daily         Current Assessment & Plan     GERD gastritis improved on omeprazole 40 mg continue therapy         Relevant Medications    omeprazole (priLOSEC) 40 MG capsule    Other Relevant Orders    PSA Screen    Abnormal liver function tests    Overview     Abnormal liver tests, we have discontinued his atorvastatin and awaiting repeated liver function tests including hepatitis panel and hep C and CMP.         Current Assessment & Plan     Previously elevated mild liver function abnormalities we have discontinued his statin therapy and await today's fasting CMP         Relevant Orders    PSA Screen       Health Encounters    Preventative health care - Primary    Overview     Patient 54-year-old male presents for annual preventative visit and physical examination on June 18, 2024         Relevant Orders    PSA Screen       Sleep    Other sleep apnea    Overview     6 informs patient that she hears him snoring occasionally holding his breath the patient states that occasionally he will startle  "himself and wake up take a deep breath feels okay the patient's oropharynx appears normal with not enlarged uvula will refer to sleep apnea pulmonary for evaluation and probable sleep test testing         Current Assessment & Plan     Refer to pulmonary for sleep apnea         Relevant Orders    Ambulatory Referral to Sleep Medicine     Other Visit Diagnoses       Special screening for malignant neoplasm of prostate        Relevant Orders    PSA Screen             There are no Patient Instructions on file for this visit.     CMP:  Lab Results   Component Value Date    BUN 12 06/19/2023    CREATININE 1.03 06/19/2023    EGFRIFNONA 85 12/06/2021    BCR 11.7 06/19/2023     06/19/2023    K 4.8 06/19/2023    CO2 23.9 06/19/2023    CALCIUM 9.9 06/19/2023    ALBUMIN 4.7 06/19/2023    BILITOT 1.3 (H) 06/19/2023    ALKPHOS 77 06/19/2023    AST 47 (H) 06/19/2023    ALT 57 (H) 06/19/2023     HbA1c:  No results found for: \"HGBA1C\"  Microalbumin:  Lab Results   Component Value Date    MICROALBUR <1.2 09/19/2019     Lipid Panel  Lab Results   Component Value Date    CHOL 253 (H) 06/19/2023    TRIG 142 06/19/2023    HDL 62 (H) 06/19/2023     (H) 06/19/2023    AST 47 (H) 06/19/2023    ALT 57 (H) 06/19/2023        Counseling was given to patient for the following topics: disease prevention.    Plan of care reviewed with patient at the conclusion of today's visit. Education was provided regarding diagnosis, management, and any prescribed or recommended OTC medications.Patient verbalizes understanding of and agreement with management plan.         Transcribed from ambient dictation for Shahram Napier MD by Ranjana Vernon RN.  06/18/24   14:59 EDT    Patient or patient representative verbalized consent for the use of Ambient Listening during the visit with  Shahram Napier MD for chart documentation. 6/18/2024  20:38 EDT          "

## 2024-06-19 NOTE — ASSESSMENT & PLAN NOTE
Previously elevated mild liver function abnormalities we have discontinued his statin therapy and await today's fasting CMP

## 2024-06-19 NOTE — ASSESSMENT & PLAN NOTE
Mixed hyperlipidemia no longer on statins because of mild elevated liver function studies with the last lab on June 19, 2023 cholesterol 253  and HDL 62 encouraged healthy cardiac diet with reduced carbs and reduce cholesterol fasting CMP and lipid are pending

## 2024-06-19 NOTE — ASSESSMENT & PLAN NOTE
Will obtain echocardiogram for valve definition and family history of valvular heart disease in mother and brother

## 2024-07-23 ENCOUNTER — HOSPITAL ENCOUNTER (OUTPATIENT)
Dept: CARDIOLOGY | Facility: HOSPITAL | Age: 54
Discharge: HOME | End: 2024-07-23
Admitting: INTERNAL MEDICINE
Payer: COMMERCIAL

## 2024-07-23 DIAGNOSIS — Z82.49 FAMILY HISTORY OF CARDIOVASCULAR DISEASE: ICD-10-CM

## 2024-07-23 LAB
BH CV ECHO MEAS - AI P1/2T: 683.1 MSEC
BH CV ECHO MEAS - AO MAX PG: 4.8 MMHG
BH CV ECHO MEAS - AO MEAN PG: 3 MMHG
BH CV ECHO MEAS - AO ROOT DIAM: 3.2 CM
BH CV ECHO MEAS - AO V2 MAX: 109 CM/SEC
BH CV ECHO MEAS - AO V2 VTI: 20.9 CM
BH CV ECHO MEAS - AVA(I,D): 2.8 CM2
BH CV ECHO MEAS - EDV(CUBED): 79.5 ML
BH CV ECHO MEAS - EF_3D-VOL: 57 %
BH CV ECHO MEAS - ESV(CUBED): 31.8 ML
BH CV ECHO MEAS - FS: 26.3 %
BH CV ECHO MEAS - IVS/LVPW: 1 CM
BH CV ECHO MEAS - IVSD: 1 CM
BH CV ECHO MEAS - LA DIMENSION: 3.7 CM
BH CV ECHO MEAS - LAT PEAK E' VEL: 6.7 CM/SEC
BH CV ECHO MEAS - LV MASS(C)D: 142.5 GRAMS
BH CV ECHO MEAS - LV MAX PG: 3.4 MMHG
BH CV ECHO MEAS - LV MEAN PG: 2 MMHG
BH CV ECHO MEAS - LV V1 MAX: 91.8 CM/SEC
BH CV ECHO MEAS - LV V1 VTI: 17.3 CM
BH CV ECHO MEAS - LVIDD: 4.3 CM
BH CV ECHO MEAS - LVIDS: 3.2 CM
BH CV ECHO MEAS - LVOT AREA: 3.4 CM2
BH CV ECHO MEAS - LVOT DIAM: 2.08 CM
BH CV ECHO MEAS - LVPWD: 1 CM
BH CV ECHO MEAS - MED PEAK E' VEL: 6 CM/SEC
BH CV ECHO MEAS - MV A MAX VEL: 75.4 CM/SEC
BH CV ECHO MEAS - MV DEC SLOPE: 372.4 CM/SEC2
BH CV ECHO MEAS - MV DEC TIME: 0.33 SEC
BH CV ECHO MEAS - MV E MAX VEL: 59.1 CM/SEC
BH CV ECHO MEAS - MV E/A: 0.78
BH CV ECHO MEAS - MV P1/2T: 58.1 MSEC
BH CV ECHO MEAS - MVA(P1/2T): 3.8 CM2
BH CV ECHO MEAS - PA ACC TIME: 0.13 SEC
BH CV ECHO MEAS - PA V2 MAX: 97.7 CM/SEC
BH CV ECHO MEAS - SV(LVOT): 59 ML
BH CV ECHO MEAS - SVI(LVOT): 26.7 ML/M2
BH CV ECHO MEAS - TAPSE (>1.6): 1.69 CM
BH CV ECHO MEASUREMENTS AVERAGE E/E' RATIO: 9.31
BH CV XLRA - RV BASE: 3.7 CM
BH CV XLRA - RV LENGTH: 6 CM
BH CV XLRA - RV MID: 3 CM
BH CV XLRA - TDI S': 9.5 CM/SEC

## 2024-07-23 PROCEDURE — 93306 TTE W/DOPPLER COMPLETE: CPT | Performed by: INTERNAL MEDICINE

## 2024-07-23 PROCEDURE — 93306 TTE W/DOPPLER COMPLETE: CPT

## 2024-09-13 RX ORDER — OMEPRAZOLE 40 MG/1
CAPSULE, DELAYED RELEASE ORAL
Qty: 90 CAPSULE | Refills: 0 | Status: SHIPPED | OUTPATIENT
Start: 2024-09-13

## 2024-09-19 ENCOUNTER — OFFICE VISIT (OUTPATIENT)
Dept: SLEEP MEDICINE | Facility: CLINIC | Age: 54
End: 2024-09-19
Payer: COMMERCIAL

## 2024-09-19 VITALS
HEIGHT: 72 IN | HEART RATE: 83 BPM | TEMPERATURE: 97.4 F | SYSTOLIC BLOOD PRESSURE: 126 MMHG | BODY MASS INDEX: 29.8 KG/M2 | WEIGHT: 220 LBS | DIASTOLIC BLOOD PRESSURE: 82 MMHG | OXYGEN SATURATION: 99 %

## 2024-09-19 DIAGNOSIS — R06.81 WITNESSED EPISODE OF APNEA: ICD-10-CM

## 2024-09-19 DIAGNOSIS — E66.3 OVERWEIGHT (BMI 25.0-29.9): ICD-10-CM

## 2024-09-19 DIAGNOSIS — Z72.0 SNUFF USER: ICD-10-CM

## 2024-09-19 DIAGNOSIS — R06.83 SNORING: Primary | ICD-10-CM

## 2024-11-06 ENCOUNTER — HOSPITAL ENCOUNTER (OUTPATIENT)
Dept: SLEEP MEDICINE | Facility: HOSPITAL | Age: 54
Discharge: HOME | End: 2024-11-06
Admitting: INTERNAL MEDICINE
Payer: COMMERCIAL

## 2024-11-06 VITALS — HEIGHT: 72 IN | WEIGHT: 220.02 LBS | BODY MASS INDEX: 29.8 KG/M2

## 2024-11-06 DIAGNOSIS — R06.81 WITNESSED EPISODE OF APNEA: ICD-10-CM

## 2024-11-06 DIAGNOSIS — R06.83 SNORING: ICD-10-CM

## 2024-11-06 PROCEDURE — 95800 SLP STDY UNATTENDED: CPT

## 2024-11-07 ENCOUNTER — LAB (OUTPATIENT)
Dept: LAB | Facility: HOSPITAL | Age: 54
End: 2024-11-07
Payer: COMMERCIAL

## 2024-11-07 DIAGNOSIS — K21.9 GERD WITHOUT ESOPHAGITIS: ICD-10-CM

## 2024-11-07 DIAGNOSIS — Z12.5 SPECIAL SCREENING FOR MALIGNANT NEOPLASM OF PROSTATE: ICD-10-CM

## 2024-11-07 DIAGNOSIS — E78.2 MIXED HYPERLIPIDEMIA: ICD-10-CM

## 2024-11-07 DIAGNOSIS — I10 BENIGN ESSENTIAL HYPERTENSION: ICD-10-CM

## 2024-11-07 DIAGNOSIS — R79.89 ABNORMAL LIVER FUNCTION TESTS: ICD-10-CM

## 2024-11-07 DIAGNOSIS — Z00.00 PREVENTATIVE HEALTH CARE: ICD-10-CM

## 2024-11-07 LAB
ALBUMIN SERPL-MCNC: 4.6 G/DL (ref 3.5–5.2)
ALBUMIN/GLOB SERPL: 1.8 G/DL
ALP SERPL-CCNC: 80 U/L (ref 39–117)
ALT SERPL W P-5'-P-CCNC: 22 U/L (ref 1–41)
ANION GAP SERPL CALCULATED.3IONS-SCNC: 11.1 MMOL/L (ref 5–15)
AST SERPL-CCNC: 24 U/L (ref 1–40)
BASOPHILS # BLD AUTO: 0.03 10*3/MM3 (ref 0–0.2)
BASOPHILS NFR BLD AUTO: 0.5 % (ref 0–1.5)
BILIRUB SERPL-MCNC: 1 MG/DL (ref 0–1.2)
BUN SERPL-MCNC: 13 MG/DL (ref 6–20)
BUN/CREAT SERPL: 12.9 (ref 7–25)
CALCIUM SPEC-SCNC: 9.7 MG/DL (ref 8.6–10.5)
CHLORIDE SERPL-SCNC: 101 MMOL/L (ref 98–107)
CHOLEST SERPL-MCNC: 235 MG/DL (ref 0–200)
CO2 SERPL-SCNC: 23.9 MMOL/L (ref 22–29)
CREAT SERPL-MCNC: 1.01 MG/DL (ref 0.76–1.27)
DEPRECATED RDW RBC AUTO: 42.5 FL (ref 37–54)
EGFRCR SERPLBLD CKD-EPI 2021: 87.3 ML/MIN/1.73
EOSINOPHIL # BLD AUTO: 0.04 10*3/MM3 (ref 0–0.4)
EOSINOPHIL NFR BLD AUTO: 0.6 % (ref 0.3–6.2)
ERYTHROCYTE [DISTWIDTH] IN BLOOD BY AUTOMATED COUNT: 12.1 % (ref 12.3–15.4)
GLOBULIN UR ELPH-MCNC: 2.6 GM/DL
GLUCOSE SERPL-MCNC: 97 MG/DL (ref 65–99)
HCT VFR BLD AUTO: 44 % (ref 37.5–51)
HDLC SERPL-MCNC: 57 MG/DL (ref 40–60)
HGB BLD-MCNC: 15.4 G/DL (ref 13–17.7)
IMM GRANULOCYTES # BLD AUTO: 0.01 10*3/MM3 (ref 0–0.05)
IMM GRANULOCYTES NFR BLD AUTO: 0.2 % (ref 0–0.5)
LDLC SERPL CALC-MCNC: 160 MG/DL (ref 0–100)
LDLC/HDLC SERPL: 2.76 {RATIO}
LYMPHOCYTES # BLD AUTO: 1.47 10*3/MM3 (ref 0.7–3.1)
LYMPHOCYTES NFR BLD AUTO: 23.3 % (ref 19.6–45.3)
MCH RBC QN AUTO: 34 PG (ref 26.6–33)
MCHC RBC AUTO-ENTMCNC: 35 G/DL (ref 31.5–35.7)
MCV RBC AUTO: 97.1 FL (ref 79–97)
MONOCYTES # BLD AUTO: 0.5 10*3/MM3 (ref 0.1–0.9)
MONOCYTES NFR BLD AUTO: 7.9 % (ref 5–12)
NEUTROPHILS NFR BLD AUTO: 4.27 10*3/MM3 (ref 1.7–7)
NEUTROPHILS NFR BLD AUTO: 67.5 % (ref 42.7–76)
NRBC BLD AUTO-RTO: 0 /100 WBC (ref 0–0.2)
PLATELET # BLD AUTO: 192 10*3/MM3 (ref 140–450)
PMV BLD AUTO: 10.2 FL (ref 6–12)
POTASSIUM SERPL-SCNC: 4.5 MMOL/L (ref 3.5–5.2)
PROT SERPL-MCNC: 7.2 G/DL (ref 6–8.5)
PSA SERPL-MCNC: 0.69 NG/ML (ref 0–4)
RBC # BLD AUTO: 4.53 10*6/MM3 (ref 4.14–5.8)
SODIUM SERPL-SCNC: 136 MMOL/L (ref 136–145)
TRIGL SERPL-MCNC: 102 MG/DL (ref 0–150)
TSH SERPL DL<=0.05 MIU/L-ACNC: 2 UIU/ML (ref 0.27–4.2)
VLDLC SERPL-MCNC: 18 MG/DL (ref 5–40)
WBC NRBC COR # BLD AUTO: 6.32 10*3/MM3 (ref 3.4–10.8)

## 2024-11-07 PROCEDURE — 80050 GENERAL HEALTH PANEL: CPT

## 2024-11-07 PROCEDURE — 80061 LIPID PANEL: CPT

## 2024-11-07 PROCEDURE — G0103 PSA SCREENING: HCPCS

## 2024-11-08 DIAGNOSIS — G47.33 OSA (OBSTRUCTIVE SLEEP APNEA): Primary | ICD-10-CM

## 2024-11-08 DIAGNOSIS — R06.83 SNORING: ICD-10-CM

## 2024-11-17 DIAGNOSIS — E78.2 MIXED HYPERLIPIDEMIA: Primary | ICD-10-CM

## 2024-11-17 RX ORDER — PRAVASTATIN SODIUM 10 MG
10 TABLET ORAL DAILY
Qty: 90 TABLET | Refills: 3 | Status: SHIPPED | OUTPATIENT
Start: 2024-11-17

## 2025-06-02 RX ORDER — OMEPRAZOLE 40 MG/1
40 CAPSULE, DELAYED RELEASE ORAL DAILY
Qty: 90 CAPSULE | Refills: 0 | Status: SHIPPED | OUTPATIENT
Start: 2025-06-02

## 2025-06-15 PROBLEM — Z00.00 PREVENTATIVE HEALTH CARE: Status: ACTIVE | Noted: 2025-06-15

## 2025-06-19 ENCOUNTER — OFFICE VISIT (OUTPATIENT)
Dept: INTERNAL MEDICINE | Facility: CLINIC | Age: 55
End: 2025-06-19
Payer: COMMERCIAL

## 2025-06-19 VITALS
WEIGHT: 223 LBS | DIASTOLIC BLOOD PRESSURE: 88 MMHG | HEART RATE: 64 BPM | SYSTOLIC BLOOD PRESSURE: 120 MMHG | OXYGEN SATURATION: 99 % | HEIGHT: 72 IN | BODY MASS INDEX: 30.2 KG/M2

## 2025-06-19 DIAGNOSIS — Z12.2 SCREENING FOR LUNG CANCER: ICD-10-CM

## 2025-06-19 DIAGNOSIS — K56.609 SMALL BOWEL OBSTRUCTION: ICD-10-CM

## 2025-06-19 DIAGNOSIS — Z98.890 HX OF COLONOSCOPY: ICD-10-CM

## 2025-06-19 DIAGNOSIS — I10 BENIGN ESSENTIAL HYPERTENSION: ICD-10-CM

## 2025-06-19 DIAGNOSIS — E66.811 OBESITY (BMI 30.0-34.9): ICD-10-CM

## 2025-06-19 DIAGNOSIS — G47.39 OTHER SLEEP APNEA: ICD-10-CM

## 2025-06-19 DIAGNOSIS — T14.90XA BONE INJURY: ICD-10-CM

## 2025-06-19 DIAGNOSIS — Z12.11 COLON CANCER SCREENING: Primary | ICD-10-CM

## 2025-06-19 DIAGNOSIS — Z98.890 H/O KNEE SURGERY: ICD-10-CM

## 2025-06-19 DIAGNOSIS — E78.2 MIXED HYPERLIPIDEMIA: ICD-10-CM

## 2025-06-19 DIAGNOSIS — K21.9 GERD WITHOUT ESOPHAGITIS: ICD-10-CM

## 2025-06-19 DIAGNOSIS — Z00.00 PREVENTATIVE HEALTH CARE: ICD-10-CM

## 2025-06-19 DIAGNOSIS — G43.801 OCULAR MIGRAINE WITH STATUS MIGRAINOSUS: ICD-10-CM

## 2025-06-19 DIAGNOSIS — G43.C0 PERIODIC HEADACHE SYNDROME, NOT INTRACTABLE: ICD-10-CM

## 2025-06-19 DIAGNOSIS — R79.89 ABNORMAL LIVER FUNCTION TESTS: ICD-10-CM

## 2025-06-19 DIAGNOSIS — J30.2 SEASONAL ALLERGIES: ICD-10-CM

## 2025-06-19 NOTE — PROGRESS NOTES
[facility]     Eitan Royal  1970   [id number]      Patient Care Team:  Shahram Napier MD as PCP - General (Internal Medicine)    Chief Complaint::   Chief Complaint   Patient presents with    Annual Exam     Patient is not fasting.  He did not start the pravastatin            HPI  History of Present Illness  The patient is a 55-year-old male who presents for an annual preventive visit and physical examination with a history of mixed hyperlipidemia, essential hypertension, abnormal liver functions, and higher body weight.    He has not initiated pravastatin therapy and reports no adherence to any specific diet. He has attempted dietary modifications without significant success.    He reports no headaches, dizziness, or lightheadedness. A few days ago, he experienced an unusual visual disturbance, akin to looking through broken glass, which he attributes to an ocular migraine. This episode, lasting approximately 20 minutes, was accompanied by mild left temporal pain but no headache or double vision. He also reported difficulty focusing during this episode. He suspects that excessive caffeine and sugar intake may have triggered this event. His usual caffeine intake is 2 cups of coffee daily. The visual disturbance was more pronounced in his left eye, but he also noticed it in his right eye when the left was closed. The issue has since resolved.    He reports no hearing loss, tinnitus, dysphagia, oral or dental issues, gingival bleeding, sore throat, cough, wheezing, or dyspnea. He experienced a transient dry cough, predominantly at night, which resolved after a week. He describes a sensation of throat scratchiness but reports no associated fever, chills, or throat pain. He reports no palpitations, gastrointestinal symptoms such as nausea, vomiting, constipation, or diarrhea, or urinary frequency at night. He reports no musculoskeletal issues with his hips, knees, ankles, arms, legs, or  back.    He has a past medical history of right fifth metatarsal fracture and right clavicle fracture. He underwent bilateral knee surgeries for ACL tears in 1988 and 1998, with the latter also addressing a torn medial collateral ligament. Both knees were stabilized with screws and cadaver grafts. He reports stable knees post-surgery. He occasionally experiences pain in his previously fractured right foot, particularly when climbing ladders.    He has a smoking history of 10 to 15 years, consuming half to one pack per day, and quit approximately 17 years ago. He is interested in obtaining a low-dose CT scan due to his smoking history and previous employment in a factory.    He takes omeprazole as needed for stomach issues.    PAST SURGICAL HISTORY:  - Bilateral knee surgeries for ACL tears (1988 and 1998)  - Abdominal surgery (2004)  - Right fifth metatarsal fracture  - Right clavicle fracture    SOCIAL HISTORY  The patient used to smoke about half a pack to a pack a day for about 10 years and quit approximately 17 years ago.  2008         Patient Active Problem List   Diagnosis    Small bowel obstruction    GERD without esophagitis    Mixed hyperlipidemia    Abnormal liver function tests    Benign essential hypertension    Cough    Hiatal hernia with GERD    Hordeolum of right eye    Hypertrophy of prostate without urinary obstruction    Irritable bowel syndrome    Obesity (BMI 30.0-34.9)    Primary localized osteoarthrosis, lower leg    Seasonal allergies    Periodic headache syndrome, not intractable    Preventative health care    Diarrhea of presumed infectious origin    Preventative health care    COVID-19 virus infection    SMITH (nonalcoholic steatohepatitis)    Preventative health care    Tick bite of right thigh    Preventative health care    Other sleep apnea    Family history of cardiovascular disease    Preventative health care    Ocular migraine with status migrainosus    H/O knee surgery    Bone injury     Hx of colonoscopy    Smoking history        Past Medical History:   Diagnosis Date    Appendiceal abscess 2004    POST     Dumping syndrome     GERD (gastroesophageal reflux disease)     Hyperlipidemia     Hypertension     Left ACL tear     Pseudomembranous colitis 2004    Right ACL tear        Past Surgical History:   Procedure Laterality Date    APPENDECTOMY  2004    CHOLECYSTECTOMY  2005    COLON SURGERY  2004    resection    COLONOSCOPY      EXPLORATORY LAPAROTOMY      KNEE ACL RECONSTRUCTION Right 1996    KNEE ARTHROSCOPY Left     KNEE CARTILAGE SURGERY      ACL and MCL       Family History   Problem Relation Age of Onset    Osteoarthritis Father     Heart disease Maternal Grandfather        Social History     Socioeconomic History    Marital status:    Tobacco Use    Smoking status: Former     Current packs/day: 0.00     Average packs/day: 0.5 packs/day for 10.0 years (5.0 ttl pk-yrs)     Types: Cigarettes     Start date:      Quit date:      Years since quittin.4    Smokeless tobacco: Former     Quit date: 2019   Vaping Use    Vaping status: Never Used   Substance and Sexual Activity    Alcohol use: Not Currently     Alcohol/week: 4.0 standard drinks of alcohol     Types: 4 Cans of beer per week    Drug use: No    Sexual activity: Defer       Allergies   Allergen Reactions    Belladonna Alkaloids Unknown (See Comments)     Donnatal    Phenobarbital Unknown (See Comments)     Donnatal       Immunization History   Administered Date(s) Administered    TD Preservative Free (Tenivac) 2023    Tdap 2015        Health Maintenance Due   Topic Date Due    Pneumococcal Vaccine 50+ (1 of 1 - PCV) Never done    ZOSTER VACCINE (1 of 2) Never done    HEPATITIS C SCREENING  Never done        Review of Systems   Constitutional: Negative.    HENT:  Positive for rhinorrhea and sinus pressure.         Seasonal allergy congestion with therapy of Allegra-D as  needed   Eyes:  Positive for blurred vision and visual disturbance.        Patient experienced recent transient blurred vision left eye greater than right with left-sided headache lasting approximately 20 minutes with resolution, he believes it was an ocular migraine type headache, not previously experienced with 100% resolution, he does get an occasional sinus headache of which Allegra-D is beneficial, otherwise his vision is normal   Respiratory:          Had positive sleep apnea test-mild November 2024 and saw Dr. Barrios who did prescribe PAP therapy, the patient apparently did not respond to information sent to him about the need for PAP therapy, data deficient   Cardiovascular: Negative.    Gastrointestinal:         Remote appendiceal abscess with bowel obstruction and extensive surgery 2004 with no sequela,  Patient's last colonoscopy was October 9, 2019 and 2 polyps were removed 1 was a tubular adenoma with suggested repeat in 5 years  Will reschedule colonoscopy with Dr. Lr and inform patient   Endocrine: Negative.    Genitourinary: Negative.    Musculoskeletal:         Remote right clavicle fracture asymptomatic, left and right knee arthroplastic surgery for ACL and MCL repair at , and remote fracture of right fifth metatarsal occasionally symptomatic when climbing ladders   Allergic/Immunologic: Positive for environmental allergies.   Neurological: Negative.    Hematological: Negative.    Psychiatric/Behavioral: Negative.        Physical Exam  Ears: Right ear with very mild amount of wax, Tympanic membranes intact bilaterally.  No facial asymmetry pupils equal and reactive no sinus tenderness  Eyes: Normal normal EOM with pupils equal and reactive  Nose: Clear  Mouth/Throat: Mouth, teeth, and gums appear normal  Neck: Supple, no abnormalities no masses bruit or thyromegaly  Respiratory: Clear to auscultation, no wheezing, rales or rhonchi  Cardiovascular: Regular rate and rhythm, no murmurs, rubs,  "or gallops blood pressure repeated at 128/78 left and right sitting  Gastrointestinal: Soft, no tenderness, no distention, no masses. Spleen and liver normal abdominal surgical scar for appendiceal abscess well-healed up 2004  Extremities: No edema, no cyanosis.  Positive peripheral pulses at the left and right dorsalis pedis, some soreness of the right lateral fifth metatarsal  Musculoskeletal: No joint or muscular abnormalities noted. Knee is stable.  Remote repair left and right arthroscopic surgery, no discomfort right clavicle minor discomfort right fifth metatarsal  Skin; clear      Vital Signs  Vitals:    06/19/25 1456   BP: 120/88   BP Location: Left arm   Patient Position: Sitting   Cuff Size: Adult   Pulse: 64   SpO2: 99%   Weight: 101 kg (223 lb)   Height: 182.9 cm (72\")   PainSc: 0-No pain     Body mass index is 30.24 kg/m².  BMI is >= 30 and <35. (Class 1 Obesity). The following options were offered after discussion;: exercise counseling/recommendations and nutrition counseling/recommendations     Advance Care Planning         Current Outpatient Medications:     omeprazole (priLOSEC) 40 MG capsule, TAKE 1 CAPSULE BY MOUTH ONCE DAILY BEFORE A MEAL, Disp: 90 capsule, Rfl: 0    pravastatin (PRAVACHOL) 10 MG tablet, Take 1 tablet by mouth Daily. (Patient not taking: Reported on 6/19/2025), Disp: 90 tablet, Rfl: 3       Results Review:    Results  Labs   - PSA level: 11/07/2024, 0.6     Procedures return to clinic for fasting CMP lipid TSH and CBC                       Arrange colonoscopy last was October 9, 2019 with single tubular adenoma and a benign hyperplastic polyp                       Recontact regards sleep apnea and PAP utilization suggestion                       Low-dose CT scan of lung order placed    Patient Wellness Counseling:   Plan of care reviewed with patient at the conclusion of today's visit. Education was provided in regards to diagnosis, diet and exercise, prostate cancer screening " discussed including benefit of early detection, potential need for follow-up, and harms associated with additional management. Patient agrees to screening.    Nutrition, physical activity, healthy weight,ways to reduce stress, adequate sleep, injury prevention, misuse of tobacco, alcohol and drugs, sexual behavior and STD's, dental health, mental health, and immunizations.    Plan of care reviewed with patient at the conclusion of today's visit. Education was provided regarding diagnosis, management and any prescribed or recommended OTC medications.  Patient verbalizes understanding of and agreement with management plan.      Medication Review: Medications reviewed and noted    Patient wellness counseling  Exercise: Encourage active ADL  Diet: Encouraged healthy low-carb low-cholesterol diet and weight loss  Screening: Return to clinic for fasting CBC CMP lipid TSH  Social History     Socioeconomic History    Marital status:    Tobacco Use    Smoking status: Former     Current packs/day: 0.00     Average packs/day: 0.5 packs/day for 10.0 years (5.0 ttl pk-yrs)     Types: Cigarettes     Start date:      Quit date:      Years since quittin.4    Smokeless tobacco: Former     Quit date: 2019   Vaping Use    Vaping status: Never Used   Substance and Sexual Activity    Alcohol use: Not Currently     Alcohol/week: 4.0 standard drinks of alcohol     Types: 4 Cans of beer per week    Drug use: No    Sexual activity: Defer        Assessment/Plan:  Assessment & Plan  1. Annual preventive visit and physical examination.  - His overall health status appears satisfactory.  - A comprehensive set of laboratory tests will be conducted, including CBC, blood count, chemistry test, blood sugar, kidney function, liver function, body salts, cholesterol panel (cholesterol, triglyceride, HDL, LDL), thyroid test, and prostate blood test.  - He has been advised to maintain adequate hydration prior to the lab work and  to abstain from food intake.  - Follow-up in 6 months.    2. Mixed hyperlipidemia.  - He has not started taking pravastatin.  - Increased cholesterol levels noted.  - He is advised to wait for the lab results before starting pravastatin.  - Pravastatin prescription pending lab results.    3. Essential hypertension.  - His blood pressure is well-controlled at 128/78 mmHg.  - No reported symptoms such as headache, dizziness, or lightheadedness.  - Regular monitoring of blood pressure.  - Continue current management.    4. Abnormal liver function.  - Liver function tests will be included in the upcoming lab work.  - No reported symptoms such as jaundice or abdominal pain.  - Regular monitoring of liver function.  - Await lab results for further assessment.    5. Overweight.  - He weighed 220 pounds in November 2024 and 223 pounds June 19, 2025  - No specific diet plan currently in place.  - Advised to consider dietary changes.  - Weight management plan pending lab results.    6. Allergic rhinitis.  - He reports using Allegra-D during the spring and fall for allergy relief.  - No reported symptoms such as dizziness.  - Regular use of Allegra-D as needed.  - Prescription for Allegra-D will be provided as needed.    7. Ocular migraine.  - He experienced an episode of visual disturbance resembling an ocular migraine, which resolved spontaneously.  - No reported symptoms such  double vision.  Resolved in approximately 20 minutes  - No further action is required unless symptoms recur.  - Monitor for recurrence of symptoms.    8. History of smoking.  - He smoked for about 10-15 years, quitting approximately 17 years ago.  - No reported symptoms such as coughing or shortness of breath.  - A low-dose CT scan will be ordered to screen for any potential lung issues due to his smoking history.  - CT scan without contrast if low-dose CT criteria not met.    9. Gastroesophageal reflux disease.  - He takes omeprazole as needed for  stomach issues.  - No reported symptoms such as nausea or vomiting.  - Current prescription for omeprazole is sufficient.  - Continue current management.     Problem List Items Addressed This Visit          Allergies and Adverse Reactions    Seasonal allergies    Overview   Seasonal allergy congestion with associated headache Allegra-D has been beneficial to continue therapy as needed         Current Assessment & Plan   Continue Allegra-D therapy for seasonal allergy congestion and occasional sinus headache            Cardiac and Vasculature    Mixed hyperlipidemia    Overview   History of mixed hyperlipidemia on atorvastatin 10 mg daily denies myalgia arthralgia continue therapy return for fasting CMP and lipid         Current Assessment & Plan    Mixed hyperlipidemia with previous values on November 7, 2024 of cholesterol 235 and  with normal liver functions November 2024 with suggestion of taking pravastatin 10 mg daily, with noted abnormal liver functions while taking atorvastatin 10 mg which was discontinued with normalization of liver function on follow-up November 2024, the patient has not started the pravastatin, have asked the patient to repeat CMP and lipid panel         Relevant Medications    pravastatin (PRAVACHOL) 10 MG tablet    Other Relevant Orders    CBC & Differential    Comprehensive Metabolic Panel    Lipid Panel    TSH    Benign essential hypertension    Overview   History of essential hypertension previously on lisinopril 5 mg for blood pressure control however developed hyperactive airway cough lisinopril was discontinued and the patient's been placed on telmisartan 20 mg daily cough has resolved his current blood pressure is 118/78 left and right sitting continue current therapy return to clinic for fasting CMP EKG sinus rhythm with no ischemic change similar to October 19, 2018         Current Assessment & Plan   Eval hypertension with current blood pressure 120/88 repeated at 128/78  left and right, currently not on medication encouraged healthy low-carb diet and weight loss and no antihyperintensive therapy at this time, but do encourage weight loss with healthy cardiac low-carb diet         Relevant Orders    CBC & Differential    Comprehensive Metabolic Panel    Lipid Panel    TSH       Endocrine and Metabolic    Obesity (BMI 30.0-34.9)    Overview   Patient's current weight is 225 pounds with a BMI of 30.52         Current Assessment & Plan   Patient's (Body mass index is 30.24 kg/m².) indicates that they are obese (BMI >30) with health conditions that include dyslipidemias . Weight is worsening. BMI  is above average; BMI management plan is completed. We discussed low calorie, low carb based diet program, portion control, and increasing exercise.             Gastrointestinal Abdominal     Small bowel obstruction    Overview   Appendix abscess. Surgery in 2004.         Current Assessment & Plan   Had acute bowel obstruction secondary to appendiceal abscess with rather extensive surgery emergently 2004 with no sequela abdominal pain or soreness, the patient does need a repeat colonoscopy with last scoping October 9, 2019 by Dr. Lr with the finding of single tubular adenoma polyp and 1 polyp that was benign hyperplasia         GERD without esophagitis    Overview   GERD esophagitis on omeprazole 40 mg daily         Current Assessment & Plan   Acute GERD gastritis with as needed use of omeprazole 40 mg.  The patient does try to eat a healthy diet with reduced spicy foods with occasional gastritis GERD controlled with omeprazole 40 as needed         Relevant Medications    omeprazole (priLOSEC) 40 MG capsule    Abnormal liver function tests    Overview   Abnormal liver tests, we have discontinued his atorvastatin and awaiting repeated liver function tests including hepatitis panel and hep C and CMP.         Current Assessment & Plan   Normal liver tests at last lab of November 7, 2024  We  will repeat CMP lab         Relevant Orders    CBC & Differential    Comprehensive Metabolic Panel    Lipid Panel    TSH    Hx of colonoscopy    Overview   Patient had colonoscopy October 9, 2019 with 2 polyps removed one was a tubular adenoma and recommended repeat in 5 years by Dr. Cam Lr.         Current Assessment & Plan   Recommend repeat colonoscopy due to tubular adenoma found October 9, 2019            Health Encounters    Preventative health care - Primary    Overview   35-year-old male for annual preventative visit and physical examination June 19, 2025         Relevant Orders    CBC & Differential    Comprehensive Metabolic Panel    Lipid Panel    TSH       Musculoskeletal and Injuries    H/O knee surgery    Overview   Right ACL repair [facility]  Left MCL repair [facility]         Current Assessment & Plan   Stable without pain discomfort swelling or instability both left and right knee         Bone injury    Overview   Remote right clavicle fracture  Remote right fifth metatarsal fracture with occasional soreness when standing on ladder         Current Assessment & Plan   No pain with remote right clavicle fracture, and no pain discomfort in both knees for the ACL and MCL repair  Does complain of some soreness in the right foot when he is working on ladders at his job at the [employer], encouraged patient to utilize ladders with wide flat steps as opposed to the roll bar steps            Neuro    Periodic headache syndrome, not intractable    Overview   Infrequent headaches this particular headache is present for the past 18 hours with posterior headache, some minor visual changes, mild nausea, generally not feeling well, at approximately 1:00 this morning the patient Advil migraine with some minor relief.         Current Assessment & Plan   Patient has occasional periodic headache not severe in addition he had what sounds like an ocular migraine lasting approximately 20 minutes with left temple  discomfort and blurry vision resolved, the patient occasionally has bloody pressures described as sinus headaches of which Allegra-D is helpful                 Ocular migraine with status migrainosus    Overview   20-minute episode of blurry vision left greater than right with left forehead discomfort occurring Monday, 16 June 2025            Sleep    Other sleep apnea    Overview   Spouse informs patient that she hears him snoring occasionally holding his breath the patient states that occasionally he will startle himself and wake up take a deep breath feels okay the patient's oropharynx appears normal with not enlarged uvula will refer to sleep apnea pulmonary for evaluation and probable sleep test testing  Saw Dr. Barrios pulmonary 9/19/2024, and PAP was ordered on November 9, 2024         Current Assessment & Plan   Patient had positive sleep study with sleep apnea mild and was prescribed PAP therapy November 2024 by Dr. Denis stephens but did not respond to request suggestion of PAP therapy.  Will inquire as to patient not utilizing suggested therapy          Other Visit Diagnoses         Screening for lung cancer        Relevant Orders     CT Chest Low Dose Cancer Screening WO    CBC & Differential    Comprehensive Metabolic Panel    Lipid Panel    TSH             Patient Instructions   Return to clinic for fasting CBC CMP lipid TSH  Low-dose CT scan of lung for past history of smoking 15 years 1 pack a day and no smoking for the past 17 done externally at the [facility]  Has not been on the pravastatin, await lab before suggesting starting  Continue omeprazole as needed and Allegra-D as needed  Encouraged healthy cardiac low-carb diet  Encouraged continued active ADL  Rediscuss sleep apnea study of November 2024 with mild sleep apnea and the suggested PAP therapy  Arrange colonoscopy, with last scope of October 9, 2019 with 1 hyperplastic polyp and 1 tubular adenoma  Return visit in 6 months  "or as needed     CMP:  Lab Results   Component Value Date    Glucose 97 11/07/2024    Glucose, UA Negative 10/19/2018    BUN 13 11/07/2024    BUN/Creatinine Ratio 12.9 11/07/2024    Creatinine 1.01 11/07/2024    Ketones, UA Negative 10/19/2018    CO2 23.9 11/07/2024    Calcium 9.7 11/07/2024    Albumin 4.6 11/07/2024    AST (SGOT) 24 11/07/2024    ALT (SGPT) 22 11/07/2024     HbA1c:  No results found for: \"HGBA1C\"  Microalbumin:  Lab Results   Component Value Date    MICROALBUR <1.2 09/19/2019     Lipid Panel  Lab Results   Component Value Date    CHOL 235 (H) 11/07/2024    TRIG 102 11/07/2024    HDL 57 11/07/2024     (H) 11/07/2024    AST 24 11/07/2024    ALT 22 11/07/2024        Counseling was given to patient for the following topics: disease prevention.    Plan of care reviewed with patient at the conclusion of today's visit. Education was provided regarding diagnosis, management, and any prescribed or recommended OTC medications.Patient verbalizes understanding of and agreement with management plan.         Patient or patient representative verbalized consent for the use of Ambient Listening during the visit with  Shahram Napier MD for chart documentation. 6/20/2025  09:22 EDT          "

## 2025-06-19 NOTE — PATIENT INSTRUCTIONS
Return to clinic for fasting CBC CMP lipid TSH  Low-dose CT scan of lung for past history of smoking 15 years 1 pack a day and no smoking for the past 17 done externally at the [facility]  Has not been on the pravastatin, await lab before suggesting starting  Continue omeprazole as needed and Allegra-D as needed  Encouraged healthy cardiac low-carb diet  Encouraged continued active ADL  Rediscuss sleep apnea study of November 2024 with mild sleep apnea and the suggested PAP therapy  Arrange colonoscopy, with last scope of October 9, 2019 with 1 hyperplastic polyp and 1 tubular adenoma  Return visit in 6 months or as needed

## 2025-06-20 ENCOUNTER — TELEPHONE (OUTPATIENT)
Dept: INTERNAL MEDICINE | Facility: CLINIC | Age: 55
End: 2025-06-20
Payer: COMMERCIAL

## 2025-06-20 PROBLEM — Z98.890 HX OF COLONOSCOPY: Status: ACTIVE | Noted: 2025-06-20

## 2025-06-20 PROBLEM — Z87.891 SMOKING HISTORY: Status: ACTIVE | Noted: 2025-06-20

## 2025-06-20 NOTE — ASSESSMENT & PLAN NOTE
Mixed hyperlipidemia with previous values on November 7, 2024 of cholesterol 235 and  with normal liver functions November 2024 with suggestion of taking pravastatin 10 mg daily, with noted abnormal liver functions while taking atorvastatin 10 mg which was discontinued with normalization of liver function on follow-up November 2024, the patient has not started the pravastatin, have asked the patient to repeat CMP and lipid panel

## 2025-06-20 NOTE — ASSESSMENT & PLAN NOTE
Had acute bowel obstruction secondary to appendiceal abscess with rather extensive surgery emergently 2004 with no sequela abdominal pain or soreness, the patient does need a repeat colonoscopy with last scoping October 9, 2019 by Dr. Lr with the finding of single tubular adenoma polyp and 1 polyp that was benign hyperplasia

## 2025-06-20 NOTE — ASSESSMENT & PLAN NOTE
Patient has occasional periodic headache not severe in addition he had what sounds like an ocular migraine lasting approximately 20 minutes with left temple discomfort and blurry vision resolved, the patient occasionally has bloody pressures described as sinus headaches of which Allegra-D is helpful

## 2025-06-20 NOTE — TELEPHONE ENCOUNTER
Plan I talked to patient at the office visit we discussed probable not eligible for low-dose CT scan and if not we will order a CT scan of the lung without contrast and will do so.

## 2025-06-20 NOTE — ASSESSMENT & PLAN NOTE
Eval hypertension with current blood pressure 120/88 repeated at 128/78 left and right, currently not on medication encouraged healthy low-carb diet and weight loss and no antihyperintensive therapy at this time, but do encourage weight loss with healthy cardiac low-carb diet

## 2025-06-20 NOTE — ASSESSMENT & PLAN NOTE
Patient had positive sleep study with sleep apnea mild and was prescribed PAP therapy November 2024 by Dr. Barrios pulmonary but did not respond to request suggestion of PAP therapy.  Will inquire as to patient not utilizing suggested therapy

## 2025-06-20 NOTE — ASSESSMENT & PLAN NOTE
Patient's (Body mass index is 30.24 kg/m².) indicates that they are obese (BMI >30) with health conditions that include dyslipidemias . Weight is worsening. BMI  is above average; BMI management plan is completed. We discussed low calorie, low carb based diet program, portion control, and increasing exercise.

## 2025-06-20 NOTE — ASSESSMENT & PLAN NOTE
Acute GERD gastritis with as needed use of omeprazole 40 mg.  The patient does try to eat a healthy diet with reduced spicy foods with occasional gastritis GERD controlled with omeprazole 40 as needed

## 2025-06-20 NOTE — ASSESSMENT & PLAN NOTE
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

## 2025-06-20 NOTE — TELEPHONE ENCOUNTER
CANDY FROM THE [facility] CALLED AND SAID THAT THE PT'S CT APT ON 06/25/25 AT 9:15 AM WOULD HAVE TO BE CANCELLED. SHE SAID THE PT DOESN'T QUALIFY FOR THE SCREENING  AND THAT SHE WOULD FAX OVER PAPERWORK ON WHY.

## 2025-06-21 DIAGNOSIS — Z00.00 PREVENTATIVE HEALTH CARE: Primary | ICD-10-CM

## 2025-06-24 ENCOUNTER — TELEPHONE (OUTPATIENT)
Dept: INTERNAL MEDICINE | Facility: CLINIC | Age: 55
End: 2025-06-24
Payer: COMMERCIAL

## 2025-06-24 NOTE — TELEPHONE ENCOUNTER
Caller: Eitan Royal    Relationship: Self    Best call back number: [phone]     What was the call regarding: PATIENT STATES DR. MURPHY ORDERED A LOW DOSE CT SCAN FOR HIM. HE HAD THIS SCHEDULED BUT WAS CALLED AND WAS TOLD THAT IT HAD TO BE CANCELED DUE TO HIS INSURANCE DENYING THIS BECAUSE THE PAPERWORK WAS FILLED OUT INCORRECTLY.  HE WOULD LIKE A CALL BACK TO DISCUSS THIS.

## 2025-06-24 NOTE — TELEPHONE ENCOUNTER
"Patient notified via voice mail message that insurance does not want to cover CT scan chest for \"lung cancer screening\".  Dr. Napier is going to order a regular CXR.   Patient notified via voice mail message that he can go to any [facility] diagnostic center to have this CXR.   "

## 2025-07-20 PROBLEM — S60.512A CAT SCRATCH OF LEFT HAND: Status: ACTIVE | Noted: 2025-07-20
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
